# Patient Record
Sex: FEMALE | Race: WHITE | Employment: FULL TIME | URBAN - METROPOLITAN AREA
[De-identification: names, ages, dates, MRNs, and addresses within clinical notes are randomized per-mention and may not be internally consistent; named-entity substitution may affect disease eponyms.]

---

## 2017-10-21 ENCOUNTER — HOSPITAL ENCOUNTER (OUTPATIENT)
Dept: MAMMOGRAPHY | Age: 52
Discharge: HOME | End: 2017-10-21
Attending: OBSTETRICS & GYNECOLOGY
Payer: COMMERCIAL

## 2017-10-21 DIAGNOSIS — Z12.31 ENCOUNTER FOR SCREENING MAMMOGRAM FOR BREAST CANCER: ICD-10-CM

## 2017-10-21 PROCEDURE — 77067 SCR MAMMO BI INCL CAD: CPT

## 2018-02-06 PROBLEM — I49.3 PVC'S (PREMATURE VENTRICULAR CONTRACTIONS): Status: ACTIVE | Noted: 2018-02-06

## 2018-02-06 PROBLEM — E11.9 CONTROLLED TYPE 2 DIABETES MELLITUS WITHOUT COMPLICATION (HCC): Status: ACTIVE | Noted: 2018-02-06

## 2018-02-06 PROBLEM — I51.7 LVH (LEFT VENTRICULAR HYPERTROPHY): Status: ACTIVE | Noted: 2018-02-06

## 2019-11-16 ENCOUNTER — ANESTHESIA EVENT (OUTPATIENT)
Dept: SURGERY | Age: 54
End: 2019-11-16
Payer: COMMERCIAL

## 2019-11-18 ENCOUNTER — ANESTHESIA (OUTPATIENT)
Dept: SURGERY | Age: 54
End: 2019-11-18
Payer: COMMERCIAL

## 2019-11-18 ENCOUNTER — HOSPITAL ENCOUNTER (OUTPATIENT)
Age: 54
Setting detail: OUTPATIENT SURGERY
Discharge: HOME | End: 2019-11-18
Attending: ORTHOPAEDIC SURGERY | Admitting: ORTHOPAEDIC SURGERY
Payer: COMMERCIAL

## 2019-11-18 VITALS
HEART RATE: 47 BPM | TEMPERATURE: 98 F | DIASTOLIC BLOOD PRESSURE: 63 MMHG | RESPIRATION RATE: 16 BRPM | SYSTOLIC BLOOD PRESSURE: 120 MMHG | OXYGEN SATURATION: 97 %

## 2019-11-18 LAB — GLUCOSE BLD STRIP.AUTO-MCNC: 127 MG/DL (ref 65–100)

## 2019-11-18 PROCEDURE — 76942 ECHO GUIDE FOR BIOPSY: CPT | Performed by: ORTHOPAEDIC SURGERY

## 2019-11-18 PROCEDURE — 77030003602 HC NDL NRV BLK BBMI -B: Performed by: ANESTHESIOLOGY

## 2019-11-18 PROCEDURE — 77030018836 HC SOL IRR NACL ICUM -A: Performed by: ORTHOPAEDIC SURGERY

## 2019-11-18 PROCEDURE — 82962 GLUCOSE BLOOD TEST: CPT

## 2019-11-18 PROCEDURE — C1713 ANCHOR/SCREW BN/BN,TIS/BN: HCPCS | Performed by: ORTHOPAEDIC SURGERY

## 2019-11-18 PROCEDURE — 77030003666 HC NDL SPINAL BD -A: Performed by: ORTHOPAEDIC SURGERY

## 2019-11-18 PROCEDURE — 74011000250 HC RX REV CODE- 250: Performed by: NURSE ANESTHETIST, CERTIFIED REGISTERED

## 2019-11-18 PROCEDURE — 76060000033 HC ANESTHESIA 1 TO 1.5 HR: Performed by: ORTHOPAEDIC SURGERY

## 2019-11-18 PROCEDURE — 76210000020 HC REC RM PH II FIRST 0.5 HR: Performed by: ORTHOPAEDIC SURGERY

## 2019-11-18 PROCEDURE — 76010010054 HC POST OP PAIN BLOCK: Performed by: ORTHOPAEDIC SURGERY

## 2019-11-18 PROCEDURE — 76010010054 HC POST OP PAIN BLOCK

## 2019-11-18 PROCEDURE — 77030027385 HC BLD SHV ARTHSCP STRY -B: Performed by: ORTHOPAEDIC SURGERY

## 2019-11-18 PROCEDURE — 74011000250 HC RX REV CODE- 250: Performed by: ANESTHESIOLOGY

## 2019-11-18 PROCEDURE — 76210000006 HC OR PH I REC 0.5 TO 1 HR: Performed by: ORTHOPAEDIC SURGERY

## 2019-11-18 PROCEDURE — 74011250636 HC RX REV CODE- 250/636: Performed by: NURSE ANESTHETIST, CERTIFIED REGISTERED

## 2019-11-18 PROCEDURE — 77030002916 HC SUT ETHLN J&J -A: Performed by: ORTHOPAEDIC SURGERY

## 2019-11-18 PROCEDURE — 74011250636 HC RX REV CODE- 250/636: Performed by: ORTHOPAEDIC SURGERY

## 2019-11-18 PROCEDURE — 77030020797 HC BIT DRL DISP SN -C: Performed by: ORTHOPAEDIC SURGERY

## 2019-11-18 PROCEDURE — 77030002933 HC SUT MCRYL J&J -A: Performed by: ORTHOPAEDIC SURGERY

## 2019-11-18 PROCEDURE — 74011250636 HC RX REV CODE- 250/636: Performed by: ANESTHESIOLOGY

## 2019-11-18 PROCEDURE — 77030040116 HC KIT SURG BIT GD Q-FX DISP S&N -C: Performed by: ORTHOPAEDIC SURGERY

## 2019-11-18 PROCEDURE — 76010000161 HC OR TIME 1 TO 1.5 HR INTENSV-TIER 1: Performed by: ORTHOPAEDIC SURGERY

## 2019-11-18 PROCEDURE — 77030033005 HC TBNG ARTHSC PMP STRY -B: Performed by: ORTHOPAEDIC SURGERY

## 2019-11-18 PROCEDURE — 77030025281 HC SPLNT ORTHGLS 1 BSNM -B: Performed by: ORTHOPAEDIC SURGERY

## 2019-11-18 PROCEDURE — 77030000032 HC CUF TRNQT ZIMM -B: Performed by: ORTHOPAEDIC SURGERY

## 2019-11-18 PROCEDURE — 77030002982 HC SUT POLYSRB J&J -A: Performed by: ORTHOPAEDIC SURGERY

## 2019-11-18 DEVICE — 1.8MM Q-FIX ALL SUTURE ANCHOR
Type: IMPLANTABLE DEVICE | Site: ANKLE | Status: FUNCTIONAL
Brand: Q-FIX

## 2019-11-18 DEVICE — FOOTPRINT ULTRA PK SUTURE ANCHOR 4.5
Type: IMPLANTABLE DEVICE | Site: ANKLE | Status: FUNCTIONAL
Brand: FOOTPRINT

## 2019-11-18 DEVICE — HEALICOIL PK 4.5 MM SUTURE ANCHOR                                    WITH ONE ULTRATAPE SUTURE COBRAID BLUE
Type: IMPLANTABLE DEVICE | Site: ANKLE | Status: FUNCTIONAL
Brand: HEALICOIL

## 2019-11-18 RX ORDER — SODIUM CHLORIDE 0.9 % (FLUSH) 0.9 %
5-40 SYRINGE (ML) INJECTION AS NEEDED
Status: DISCONTINUED | OUTPATIENT
Start: 2019-11-18 | End: 2019-11-18 | Stop reason: HOSPADM

## 2019-11-18 RX ORDER — ONDANSETRON 2 MG/ML
INJECTION INTRAMUSCULAR; INTRAVENOUS AS NEEDED
Status: DISCONTINUED | OUTPATIENT
Start: 2019-11-18 | End: 2019-11-18 | Stop reason: HOSPADM

## 2019-11-18 RX ORDER — DEXAMETHASONE SODIUM PHOSPHATE 4 MG/ML
INJECTION, SOLUTION INTRA-ARTICULAR; INTRALESIONAL; INTRAMUSCULAR; INTRAVENOUS; SOFT TISSUE AS NEEDED
Status: DISCONTINUED | OUTPATIENT
Start: 2019-11-18 | End: 2019-11-18 | Stop reason: HOSPADM

## 2019-11-18 RX ORDER — HYDROMORPHONE HYDROCHLORIDE 2 MG/ML
0.5 INJECTION, SOLUTION INTRAMUSCULAR; INTRAVENOUS; SUBCUTANEOUS
Status: DISCONTINUED | OUTPATIENT
Start: 2019-11-18 | End: 2019-11-18 | Stop reason: HOSPADM

## 2019-11-18 RX ORDER — OXYCODONE HYDROCHLORIDE 5 MG/1
10 TABLET ORAL
Status: DISCONTINUED | OUTPATIENT
Start: 2019-11-18 | End: 2019-11-18 | Stop reason: HOSPADM

## 2019-11-18 RX ORDER — LIDOCAINE HYDROCHLORIDE 10 MG/ML
0.1 INJECTION INFILTRATION; PERINEURAL AS NEEDED
Status: DISCONTINUED | OUTPATIENT
Start: 2019-11-18 | End: 2019-11-18 | Stop reason: HOSPADM

## 2019-11-18 RX ORDER — PROPOFOL 10 MG/ML
INJECTION, EMULSION INTRAVENOUS AS NEEDED
Status: DISCONTINUED | OUTPATIENT
Start: 2019-11-18 | End: 2019-11-18 | Stop reason: HOSPADM

## 2019-11-18 RX ORDER — SODIUM CHLORIDE, SODIUM LACTATE, POTASSIUM CHLORIDE, CALCIUM CHLORIDE 600; 310; 30; 20 MG/100ML; MG/100ML; MG/100ML; MG/100ML
100 INJECTION, SOLUTION INTRAVENOUS CONTINUOUS
Status: DISCONTINUED | OUTPATIENT
Start: 2019-11-18 | End: 2019-11-18 | Stop reason: HOSPADM

## 2019-11-18 RX ORDER — MIDAZOLAM HYDROCHLORIDE 1 MG/ML
INJECTION, SOLUTION INTRAMUSCULAR; INTRAVENOUS AS NEEDED
Status: DISCONTINUED | OUTPATIENT
Start: 2019-11-18 | End: 2019-11-18 | Stop reason: HOSPADM

## 2019-11-18 RX ORDER — LIDOCAINE HYDROCHLORIDE 20 MG/ML
INJECTION, SOLUTION EPIDURAL; INFILTRATION; INTRACAUDAL; PERINEURAL AS NEEDED
Status: DISCONTINUED | OUTPATIENT
Start: 2019-11-18 | End: 2019-11-18 | Stop reason: HOSPADM

## 2019-11-18 RX ORDER — LABETALOL HYDROCHLORIDE 5 MG/ML
INJECTION, SOLUTION INTRAVENOUS AS NEEDED
Status: DISCONTINUED | OUTPATIENT
Start: 2019-11-18 | End: 2019-11-18 | Stop reason: HOSPADM

## 2019-11-18 RX ORDER — MIDAZOLAM HYDROCHLORIDE 1 MG/ML
2 INJECTION, SOLUTION INTRAMUSCULAR; INTRAVENOUS
Status: DISCONTINUED | OUTPATIENT
Start: 2019-11-18 | End: 2019-11-18 | Stop reason: HOSPADM

## 2019-11-18 RX ORDER — ALBUTEROL SULFATE 0.83 MG/ML
2.5 SOLUTION RESPIRATORY (INHALATION) AS NEEDED
Status: DISCONTINUED | OUTPATIENT
Start: 2019-11-18 | End: 2019-11-18 | Stop reason: HOSPADM

## 2019-11-18 RX ORDER — FENTANYL CITRATE 50 UG/ML
100 INJECTION, SOLUTION INTRAMUSCULAR; INTRAVENOUS ONCE
Status: DISCONTINUED | OUTPATIENT
Start: 2019-11-18 | End: 2019-11-18 | Stop reason: HOSPADM

## 2019-11-18 RX ORDER — ESMOLOL HYDROCHLORIDE 10 MG/ML
INJECTION INTRAVENOUS AS NEEDED
Status: DISCONTINUED | OUTPATIENT
Start: 2019-11-18 | End: 2019-11-18 | Stop reason: HOSPADM

## 2019-11-18 RX ORDER — ONDANSETRON 2 MG/ML
4 INJECTION INTRAMUSCULAR; INTRAVENOUS ONCE
Status: DISCONTINUED | OUTPATIENT
Start: 2019-11-18 | End: 2019-11-18 | Stop reason: HOSPADM

## 2019-11-18 RX ORDER — MIDAZOLAM HYDROCHLORIDE 1 MG/ML
2 INJECTION, SOLUTION INTRAMUSCULAR; INTRAVENOUS ONCE
Status: COMPLETED | OUTPATIENT
Start: 2019-11-18 | End: 2019-11-18

## 2019-11-18 RX ORDER — BUPIVACAINE HYDROCHLORIDE AND EPINEPHRINE 2.5; 5 MG/ML; UG/ML
INJECTION, SOLUTION EPIDURAL; INFILTRATION; INTRACAUDAL; PERINEURAL
Status: COMPLETED | OUTPATIENT
Start: 2019-11-18 | End: 2019-11-18

## 2019-11-18 RX ORDER — NALOXONE HYDROCHLORIDE 0.4 MG/ML
0.1 INJECTION, SOLUTION INTRAMUSCULAR; INTRAVENOUS; SUBCUTANEOUS AS NEEDED
Status: DISCONTINUED | OUTPATIENT
Start: 2019-11-18 | End: 2019-11-18 | Stop reason: HOSPADM

## 2019-11-18 RX ORDER — OXYCODONE HYDROCHLORIDE 5 MG/1
5 TABLET ORAL
Status: DISCONTINUED | OUTPATIENT
Start: 2019-11-18 | End: 2019-11-18 | Stop reason: HOSPADM

## 2019-11-18 RX ORDER — DIPHENHYDRAMINE HYDROCHLORIDE 50 MG/ML
12.5 INJECTION, SOLUTION INTRAMUSCULAR; INTRAVENOUS
Status: DISCONTINUED | OUTPATIENT
Start: 2019-11-18 | End: 2019-11-18 | Stop reason: HOSPADM

## 2019-11-18 RX ORDER — PROPOFOL 10 MG/ML
INJECTION, EMULSION INTRAVENOUS
Status: DISCONTINUED | OUTPATIENT
Start: 2019-11-18 | End: 2019-11-18 | Stop reason: HOSPADM

## 2019-11-18 RX ORDER — BUPIVACAINE HYDROCHLORIDE AND EPINEPHRINE 5; 5 MG/ML; UG/ML
INJECTION, SOLUTION EPIDURAL; INTRACAUDAL; PERINEURAL
Status: COMPLETED | OUTPATIENT
Start: 2019-11-18 | End: 2019-11-18

## 2019-11-18 RX ORDER — SODIUM CHLORIDE 0.9 % (FLUSH) 0.9 %
5-40 SYRINGE (ML) INJECTION EVERY 8 HOURS
Status: DISCONTINUED | OUTPATIENT
Start: 2019-11-18 | End: 2019-11-18 | Stop reason: HOSPADM

## 2019-11-18 RX ORDER — CEFAZOLIN SODIUM/WATER 2 G/20 ML
2 SYRINGE (ML) INTRAVENOUS ONCE
Status: COMPLETED | OUTPATIENT
Start: 2019-11-18 | End: 2019-11-18

## 2019-11-18 RX ADMIN — ONDANSETRON 4 MG: 2 INJECTION INTRAMUSCULAR; INTRAVENOUS at 08:48

## 2019-11-18 RX ADMIN — SODIUM CHLORIDE, SODIUM LACTATE, POTASSIUM CHLORIDE, AND CALCIUM CHLORIDE 100 ML/HR: 600; 310; 30; 20 INJECTION, SOLUTION INTRAVENOUS at 07:15

## 2019-11-18 RX ADMIN — ESMOLOL HYDROCHLORIDE 10 MG: 10 INJECTION, SOLUTION INTRAVENOUS at 09:07

## 2019-11-18 RX ADMIN — BUPIVACAINE HYDROCHLORIDE AND EPINEPHRINE BITARTRATE 15 ML: 2.5; .005 INJECTION, SOLUTION EPIDURAL; INFILTRATION; INTRACAUDAL; PERINEURAL at 07:21

## 2019-11-18 RX ADMIN — LIDOCAINE HYDROCHLORIDE 50 MG: 20 INJECTION, SOLUTION EPIDURAL; INFILTRATION; INTRACAUDAL; PERINEURAL at 08:40

## 2019-11-18 RX ADMIN — MIDAZOLAM HYDROCHLORIDE 0.5 MG: 2 INJECTION, SOLUTION INTRAMUSCULAR; INTRAVENOUS at 08:52

## 2019-11-18 RX ADMIN — MIDAZOLAM 2 MG: 1 INJECTION INTRAMUSCULAR; INTRAVENOUS at 07:17

## 2019-11-18 RX ADMIN — PROPOFOL 30 MG: 10 INJECTION, EMULSION INTRAVENOUS at 08:40

## 2019-11-18 RX ADMIN — DEXAMETHASONE SODIUM PHOSPHATE 4 MG: 4 INJECTION, SOLUTION INTRAMUSCULAR; INTRAVENOUS at 08:48

## 2019-11-18 RX ADMIN — MIDAZOLAM HYDROCHLORIDE 1 MG: 2 INJECTION, SOLUTION INTRAMUSCULAR; INTRAVENOUS at 08:48

## 2019-11-18 RX ADMIN — PROPOFOL 250 MCG/KG/MIN: 10 INJECTION, EMULSION INTRAVENOUS at 08:40

## 2019-11-18 RX ADMIN — Medication 2 G: at 08:40

## 2019-11-18 RX ADMIN — BUPIVACAINE HYDROCHLORIDE AND EPINEPHRINE BITARTRATE 15 ML: 5; .005 INJECTION, SOLUTION EPIDURAL; INTRACAUDAL; PERINEURAL at 07:21

## 2019-11-18 RX ADMIN — LABETALOL HYDROCHLORIDE 10 MG: 5 INJECTION INTRAVENOUS at 09:20

## 2019-11-18 RX ADMIN — MIDAZOLAM HYDROCHLORIDE 0.5 MG: 2 INJECTION, SOLUTION INTRAMUSCULAR; INTRAVENOUS at 08:58

## 2019-11-18 NOTE — ANESTHESIA POSTPROCEDURE EVALUATION
Procedure(s):  LEFT ANKLE LIGAMENT RECONSTRUCTION  LEFT ANKLE ARTHROSCOPY CHOICE ANES. total IV anesthesia    Anesthesia Post Evaluation      Multimodal analgesia: multimodal analgesia used between 6 hours prior to anesthesia start to PACU discharge  Patient location during evaluation: PACU  Patient participation: complete - patient participated  Level of consciousness: awake and awake and alert  Pain management: adequate  Airway patency: patent  Anesthetic complications: no  Cardiovascular status: acceptable  Respiratory status: acceptable  Hydration status: acceptable  Post anesthesia nausea and vomiting:  controlled      Vitals Value Taken Time   /70 11/18/2019 10:19 AM   Temp 36.1 °C (97 °F) 11/18/2019  9:46 AM   Pulse 50 11/18/2019 10:21 AM   Resp 16 11/18/2019  9:46 AM   SpO2 98 % 11/18/2019 10:21 AM   Vitals shown include unvalidated device data.

## 2019-11-18 NOTE — ANESTHESIA PROCEDURE NOTES
Peripheral Block    Start time: 11/18/2019 7:17 AM  End time: 11/18/2019 7:21 AM  Performed by: Danilo Baeza MD  Authorized by: Danilo Baeza MD       Pre-procedure:    Indications: at surgeon's request and post-op pain management    Preanesthetic Checklist: patient identified, risks and benefits discussed, site marked, timeout performed, anesthesia consent given and patient being monitored    Timeout Time: 07:07          Block Type:   Block Type:  Popliteal  Laterality:  Left  Monitoring:  Standard ASA monitoring, continuous pulse ox, frequent vital sign checks, heart rate, oxygen and responsive to questions  Injection Technique:  Single shot  Procedures: ultrasound guided    Prep: chlorhexidine    Needle Type:  Stimuplex  Needle Gauge:  21 G  Needle Localization:  Ultrasound guidance and anatomical landmarks    Assessment:  Number of attempts:  1  Injection Assessment:  Incremental injection every 5 mL, local visualized surrounding nerve on ultrasound, negative aspiration for blood, no paresthesia, no intravascular symptoms and ultrasound image on chart  Patient tolerance:  Patient tolerated the procedure well with no immediate complications

## 2019-11-18 NOTE — ANESTHESIA PREPROCEDURE EVALUATION
Relevant Problems   No relevant active problems       Anesthetic History               Review of Systems / Medical History  Patient summary reviewed, nursing notes reviewed and pertinent labs reviewed    Pulmonary                   Neuro/Psych              Cardiovascular    Hypertension: well controlled              Exercise tolerance: >4 METS     GI/Hepatic/Renal                Endo/Other    Diabetes: well controlled, type 2         Other Findings              Physical Exam    Airway  Mallampati: II  TM Distance: 4 - 6 cm  Neck ROM: normal range of motion   Mouth opening: Normal     Cardiovascular  Regular rate and rhythm,  S1 and S2 normal,  no murmur, click, rub, or gallop             Dental  No notable dental hx       Pulmonary  Breath sounds clear to auscultation               Abdominal         Other Findings            Anesthetic Plan    ASA: 2  Anesthesia type: total IV anesthesia      Post-op pain plan if not by surgeon: peripheral nerve block single    Induction: Intravenous  Anesthetic plan and risks discussed with: Patient

## 2019-11-18 NOTE — DISCHARGE INSTRUCTIONS
ACTIVITY  Elevate foot. No weight bearing on operative foot. Get up and out of bed frequently. Move legs as much as possible while in bed. DIET  Clear liquids until no nausea or vomiting; then light diet for the first day. Advance to regular diet on second day, unless your doctor orders otherwise. PAIN  Take pain medications as directed by your doctor. Call your doctor if pain is NOT relieved by medication. DRESSING CARE   Keep dry and in place until follow up appointment    CALL YOUR DOCTOR IF YOU HAVE  Excessive bleeding that does not stop after holding mild pressure over the area. Temperature of 101 degrees or above. Redness, excessive swelling or bruising, and/or green or yellow, smelly discharge from incision. Loss of sensation - cold, white or blue toes. AFTER ANESTHESIA  For the first 24 hours and while taking narcotics for pain: DO NOT Drive, Drink Alcoholic beverages, or make important Decisions. Be aware of dizziness following anesthesia and while taking pain medication. OTHER INSTRUCTIONS    Take one 325 mg Aspirin every day while in splint. Get up and out of bed frequently; while in bed move your legs as much as possible. Appointment Date-As scheduled    Doctor's Number- [phone]  Patient Education        Learning About How to Use Crutches  Your Care Instructions  Crutches can help you walk when you have an injured hip, leg, knee, ankle, or foot. Your doctor will tell you how much weight--if any--you can put on your leg. Be sure your crutches fit you. When you stand up in your normal posture, there should be space for two or three fingers between the top of the crutch and your armpit. When you let your hands hang down, the hand  should be at your wrists. When you put your hands on the hand , your elbows should be slightly bent. To stay safe when using crutches:  · Look straight ahead, not down at your feet.   · Clear away small rugs, cords, or anything else that could cause you to trip, slip, or fall. · Be very careful around pets and small children. They can get in your path when you least expect it. · Be sure the rubber tips on your crutches are clean and in good condition to help prevent slipping. · Avoid slick conditions, such as wet floors and snowy or icy driveways. In bad weather, be especially careful on curbs and steps. How to use crutches  Getting ready to walk    1. Bend your elbows slightly. Press the padded top parts of the crutches against your sides, under your armpits. 2. If you have been told not to put any weight on your injured leg, keep that leg bent and off the ground. How to walk with crutches when you can put weight on the injured leg    1. Put both crutches about 12 inches in front of you. The crutches and your feet should form a triangle. Hold the crutches close enough to your body so you can push straight down on them, but leave room between the crutches for your body to pass through. Don't lean forward to reach farther. 2. Put your weight on the handgrips, not on the pads under your arms. Constant pressure against your underarms can cause numbness. 3. Move your weak or injured leg forward so it's almost even with the crutches. 4. Bring your good leg up, so it's even with your weak or injured leg. 5. Move your crutches about 12 inches in front of you, and start the next step. Sitting down    1. To sit, back up to the chair. Use one hand to hold both crutches by the handgrips, beside your injured leg. With the other hand, hold onto the seat and slowly lower yourself onto the chair. 2. Lay the crutches on the ground near your chair. If you prop them up, they may fall over. Getting up from a chair    1. To get up from a chair,  the crutches and put them in one hand beside your injured leg. 2. Put your weight on the handgrips of the crutches and on your strong leg to stand up.     How to go up and down stairs using crutches    1. Stand near the edge of the stairs. 2. Go up or down the stairs. ? If you are going up, step up with your stronger leg. Then bring the crutches and your weak or injured leg to the upper step. ? If you are going down, put your crutches and your weak or injured leg on the lower step. Then bring your stronger leg down to the lower step. Remember \"up with the good, and down with the bad\" to help you lead with the correct leg. Crutches: How to go up and down stairs with handrails    1. Stand near the edge of the stairs. 2. Put both crutches under the arm opposite the handrail. 3. Use the hand opposite the handrail to hold both crutches by the handgrips. 4. Hold onto the handrail as you go up or down. 5. Go up or down the stairs. ? If you are going up, step up with your stronger leg. Then bring the crutches and your weak or injured leg to the upper step. ? If you are going down, put your crutches and your weak or injured leg on the lower step. Then bring your stronger leg down to the lower step. Remember \"up with the good, down with the bad\" to help you lead with the correct leg. Follow-up care is a key part of your treatment and safety. Be sure to make and go to all appointments, and call your doctor if you are having problems. It's also a good idea to know your test results and keep a list of the medicines you take. Where can you learn more? Go to http://loretta-ye.info/. Enter X791 in the search box to learn more about \"Learning About How to Use Crutches. \"  Current as of: June 26, 2019  Content Version: 12.2  © 5242-1225 [employer]. Care instructions adapted under license by [employer] (which disclaims liability or warranty for this information).  If you have questions about a medical condition or this instruction, always ask your healthcare professional. [address] any warranty or liability for your use of this information. DISCHARGE SUMMARY from Nurse    PATIENT INSTRUCTIONS:    After general anesthesia or intravenous sedation, for 24 hours or while taking prescription Narcotics:  · Limit your activities  · Do not drive and operate hazardous machinery  · Do not make important personal or business decisions  · Do  not drink alcoholic beverages  · If you have not urinated within 8 hours after discharge, please contact your surgeon on call. *  Please give a list of your current medications to your Primary Care Provider. *  Please update this list whenever your medications are discontinued, doses are      changed, or new medications (including over-the-counter products) are added. *  Please carry medication information at all times in case of emergency situations. These are general instructions for a healthy lifestyle:    No smoking/ No tobacco products/ Avoid exposure to second hand smoke    Surgeon General's Warning:  Quitting smoking now greatly reduces serious risk to your health. Obesity, smoking, and sedentary lifestyle greatly increases your risk for illness    A healthy diet, regular physical exercise & weight monitoring are important for maintaining a healthy lifestyle    You may be retaining fluid if you have a history of heart failure or if you experience any of the following symptoms:  Weight gain of 3 pounds or more overnight or 5 pounds in a week, increased swelling in our hands or feet or shortness of breath while lying flat in bed. Please call your doctor as soon as you notice any of these symptoms; do not wait until your next office visit. Recognize signs and symptoms of STROKE:    F-face looks uneven    A-arms unable to move or move unevenly    S-speech slurred or non-existent    T-time-call 911 as soon as signs and symptoms begin-DO NOT go       Back to bed or wait to see if you get better-TIME IS BRAIN.

## 2019-11-19 NOTE — OP NOTES
[facility]  OPERATIVE REPORT    Name:  Randa العراقي  MR#:  [id number]  :  1965  ACCOUNT #:  [de-identified]  DATE OF SERVICE:  2019    PREOPERATIVE DIAGNOSIS:  Left chronic lateral ankle instability. POSTOPERATIVE DIAGNOSIS:  Left chronic lateral ankle instability. PROCEDURES PERFORMED:  1. Left ankle arthroscopy with microfracture of lateral talar osteochondral defect, 5 x 5 cm, 64209.  2.  Left lateral ankle ligament reconstruction, 37152. SURGEON:  Melina Shabazz III, MD        ANESTHESIA:  Popliteal block with monitored anesthesia care. ESTIMATED BLOOD LOSS:  Minimal.    TOURNIQUET TIME:  38 minutes at 250 mmHg. ANTIBIOTIC PROPHYLAXIS:  Ancef given prior to procedure. INDICATION:  The patient is a 51-year-old white female with symptomatic left chronic lateral ankle instability. She has failed conservative therapy and desires surgical treatment. Risks and benefits of the procedure including, but not limited to anesthetic complications, myocardial infarction, stroke, and death; and surgical complications including damage to nerve and blood vessels, risk of infection, incomplete pain relief, risk of recurrence, and need for additional surgery were discussed with the patient. She understands the risks and wish to proceed with surgery at this time. DETAILS OF PROCEDURE:  The patient's operative site was marked with indelible ink in the preoperative holding area. A block was placed by the Department of Anesthesia. The patient was brought to the operating room and placed supine. After preoperative surgical time-out, the left lower extremity was identified as surgical site, prepped and draped in standard sterile fashion with ChloraPrep solution. Standard anteromedial and anterolateral arthroscopic portals were established using nick and spread technique. Arthroscopic exam of the ankle was then performed at that time.   There was some synovitis located in the lateral gutter which was debrided using the oscillating shaver. The patient did have a full-thickness lateral talar OCD, which was debrided out to a stable border using a curette. Microfracture technique using an awl was also used to create bleeding channels at the base of the lesion. The scope equipment were removed. Both portals were closed using nylon sutures. Lateral approach to distal fibula was then performed at that time. The ATFL and CFL were removed with cuts from the distal fibula and then imbricated using suture anchors followed by suture tape placed from the tip of the fibula to the talar body. The inferior extensor retinaculum was then oversewn using Vicryl suture. The skin was repaired using Monocryl and nylon sutures. A sterile dressing was then applied followed by well-padded posterior splint. Anesthesia was discontinued. The patient was transferred back to recovery bed and taken to the recovery room in satisfactory condition. She appeared to tolerate the procedure well. There were no apparent surgical or anesthetic complications. All needle, instrument, and sponge counts were correct.       MD FRANCESCA Sanchez/[email or web address]  D:  11/18/2019 17:00  T:  11/19/2019 1:13  JOB #:  [id number]

## 2020-08-31 ENCOUNTER — HOSPITAL ENCOUNTER (OUTPATIENT)
Dept: PHYSICAL THERAPY | Age: 55
Discharge: HOME | End: 2020-08-31
Payer: COMMERCIAL

## 2020-08-31 PROCEDURE — 97161 PT EVAL LOW COMPLEX 20 MIN: CPT

## 2020-08-31 PROCEDURE — 97110 THERAPEUTIC EXERCISES: CPT

## 2020-08-31 NOTE — THERAPY EVALUATION
Kenya Orozco  : 1965  Primary: [facility]*  Secondary:  [facility] at [address]  [phone]   [phone]        OUTPATIENT PHYSICAL THERAPY:Initial Assessment 2020   ICD-10: Treatment Diagnosis: Left shoulder pain (M25.512); Muscle weakness, generalized (M62.81)  Precautions/Allergies:   Adhesive tape; Dimetapp [brompheniramine-pseudoephedrin]; Other medication; Purple dye; Shellfish containing products; and Pseudoephedrine   TREATMENT PLAN:  Effective Dates: 2020 TO 10/30/2020 (60 days). Frequency/Duration: 2 times a week for 60 Day(s) MEDICAL/REFERRING DIAGNOSIS:  Pain in left shoulder [M25.512]   DATE OF ONSET: 8/10/20  REFERRING PHYSICIAN: Soha Edwards*  MD Orders: Veronica Mcgill and Treat  Return MD Appointment: TBD     INITIAL ASSESSMENT:  Ms. Aixa Porter presents to physical therapy today with complaints of L shoulder pain that began on 8/10/20. She exhibits a loss of P/AROM, decreased glenohumeral joint mobility, and slight decrease in posterior cuff strength. These restrictions make it hard for her to reach behind her back, perform sudden movements with her arm or drive for long periods of time without pain. She will benefit from skilled therapy to address theses limitations to allow the patient to return to pain free ADL, functional and work activities. PROBLEM LIST (Impacting functional limitations):  1. Decreased Strength  2. Decreased ADL/Functional Activities  3. Increased Pain  4. Decreased Activity Tolerance  5. Decreased Flexibility/Joint Mobility  6. Decreased Knowledge of Precautions  7. Decreased [place] with Home Exercise Program INTERVENTIONS PLANNED: (Treatment may consist of any combination of the following)  1. Home Exercise Program (HEP)  2. Manual Therapy  3. Neuromuscular Re-education/Strengthening  4. Range of Motion (ROM)  5. Therapeutic Activites  6.  Therapeutic Exercise/Strengthening     GOALS: (Goals have been discussed and agreed upon with patient.)  Discharge Goals: Time Frame: 8/31/20 - 10/30/20  1. Patient will be independent with home exercise program without assistance from therapist.   2. Patient will report 11/55 Q-DASH score to demonstrate improved functional capacity. 3. Patient will demonstrate pain free symmetrical shoulder AROM to enable her to reach behind her back to don/doff bra. 4. Patient will report no pain when driving with L hand on top of wheel to improve her ability to perform work duties. OUTCOME MEASURE:   Tool Used: Disabilities of the Arm, Shoulder and Hand (DASH) Questionnaire - Quick Version  Score:  Initial: 22/55 (8/31/20)  Most Recent: X/55 (Date: -- )   Interpretation of Score: The DASH is designed to measure the activities of daily living in person's with upper extremity dysfunction or pain. Each section is scored on a 1-5 scale, 5 representing the greatest disability. The scores of each section are added together for a total score of 55. MEDICAL NECESSITY:   · Patient is expected to demonstrate progress in strength, range of motion, coordination and functional technique to increase independence with reaching behind her back and away from her body, and driving. REASON FOR SERVICES/OTHER COMMENTS:  · Patient continues to require present interventions due to patient's inability to perfrom all ADL and functional activities without pain. Total Duration:  PT Patient Time In/Time Out  Time In: 1505  Time Out: 1600    Rehabilitation Potential For Stated Goals: Excellent  Regarding Jacinda MYRIAM Weber's therapy, I certify that the treatment plan above will be carried out by a therapist or under their direction. Thank you for this referral,  Niesha Hankins     Referring Physician Signature: Esme Medina* No Signature is Required for this note.      PAIN/SUBJECTIVE:   Initial: Pain Intensity 1: 10/10 Post Session: 8/10   HISTORY:   History of Injury/Illness (Reason for Referral):  Ellard Kawasaki presents to physical therapy today with complaints of L shoulder pain that began on 8/10/20. She was reaching up into a top cabinet and felt a sharp pain and tearing sensation in her lateral shoulder. She at first wondered if it was from Crestor she was taking as she was also having multiple joint pain. She stopped the Crestor and L shoulder pain continued. Chief complaint of lateral and posterior shoulder pain when abducting or extending her arm, reaching behind her back or making a sudden move with her L UE. She has to drive a lot for her work and reports L shoulder pain when trying to keep her L hand at the top of the steering wheel for long periods of time. No history of shoulder problems. No imaging or injections have been completed at this time. No reports of radicular symptoms, neck pain, crepitus or weakness. She takes Meloxicam prn. PMH significant for diabetes, high cholesterol and HTN. Past Medical History/Comorbidities:   Ms. Manjinder Laura  has a past medical history of Diabetes ([address] History of anxiety, Hypertension, Menopause, Murmur, and PMDD (premenstrual dysphoric disorder). She also has no past medical history of Coagulation defects, COPD, Other ill-defined conditions(799.89), Psychiatric disorder, or Unspecified adverse effect of anesthesia. Ms. Manjinder Laura  has a past surgical history that includes hx lap cholecystectomy; hx  section; hx tubal ligation; hx tonsillectomy; hx rhinoplasty; hx total abdominal hysterectomy; hx orthopaedic (Right); hx orthopaedic (Left, ); and hx wisdom teeth extraction. Social History/Living Environment:     No barriers to rehab at home.  She works as an  requiring a lot of driving which can aggravate her shoulder  Prior Level of Function/Work/Activity:  Fully functioning in L UE before injury  Dominant Side:         RIGHT   Ambulatory/Rehab Services H2 Model Falls Risk Assessment   Risk Factors:       No Risk Factors Identified Ability to Rise from Chair:       (0)  Ability to rise in a single movement   Falls Prevention Plan:       No modifications necessary   Total: (5 or greater = High Risk): 0   ©2010 [facility] [employer]. All Rights Reserved. [facility] Patent #[id number]. Federal Law prohibits the replication, distribution or use without written permission from [facility] [employer]   Current Medications:       Current Outpatient Medications:     citalopram (CELEXA) 20 mg tablet, TAKE 1 TABLET BY MOUTH EVERY MORNING, Disp: 90 Tab, Rfl: 1    estradioL (VIVELLE) 0.1 mg/24 hr, Apply 1 patch transdermal twice weekly, Disp: 24 Patch, Rfl: 3    fluticasone (FLONASE) 50 mcg/actuation nasal spray, 2 Sprays by Both Nostrils route daily. , Disp: 1 Bottle, Rfl: 12    metFORMIN (GLUMETZA) 1,000 mg TG24 24 hour tablet, Take  by mouth daily. , Disp: , Rfl:     lisinopril (PRINIVIL, ZESTRIL) 20 mg tablet, Take  by mouth daily. , Disp: , Rfl:     fluticasone (FLONASE) 50 mcg/actuation nasal spray, 2 Sprays by Both Nostrils route two (2) times a day., Disp: , Rfl:     Cetirizine (ZYRTEC) 10 mg cap, Take  by mouth., Disp: , Rfl:    Date Last Reviewed:  8/31/2020   Number of Personal Factors/Comorbidities that affect the Plan of Care: 0: LOW COMPLEXITY   EXAMINATION:   OBSERVATION/POSTURE: Rounded shoulders, forward head.  Normal scapular positioning     PALPATION: No TTP in L shoulder    ROM: measured in degrees      RIGHT LEFT   Flexion 150/170 (active/passive) 145/160 - pain   Abduction full Full but slow   External Rotation 90/95 (45/90 deg abd) 58/60 - pain   Internal Rotation 60 60     STRENGTH:      RIGHT LEFT   Flexion 5/5 4+/5   Abduction 5/5 4+/5   External Rotation 5/5 4+/5   Internal Rotation 5/5 5/5     SPECIAL TESTS: (+) Neer, (+) HK, (-) drop arm, (-) ER lag, (-) empty/full can    NEUROLOGICAL SCREEN: normal    FUNCTIONAL MOBILITY      RIGHT LEFT   [place]y ER T4 T4 - some pain   [place] IR T6 T11   Horizontal adduction Posterior shld Posterior shld     FLEXIBILITY: decreased L pectoral muscles    JOINT MOBILITY: decreased glenohumeral joint mobility in posterior/inferior direction    HK ([place]); ER (external rotation); IR (internal rotation); HA (horizontal adduction); HAB (horizontal abduction); GH (glenohumeral); AC (acromioclavicular); TTP (tenderness to palpation); UT (upper trapezius); * (painful); nt (not tested); WFL (within functional limits)     Body Structures Involved:  1. Nerves  2. Bones  3. Joints  4. Muscles  5. Ligaments Body Functions Affected:  1. Sensory/Pain  2. Neuromusculoskeletal  3. Movement Related Activities and Participation Affected:  1. General Tasks and Demands  2. Mobility  3.  Interpersonal Interactions and Relationships   Number of elements (examined above) that affect the Plan of Care: 4+: HIGH COMPLEXITY   CLINICAL PRESENTATION:   Presentation: Stable and uncomplicated: LOW COMPLEXITY   CLINICAL DECISION MAKING:   Use of outcome tool(s) and clinical judgement create a POC that gives a: Clear prediction of patient's progress: LOW COMPLEXITY

## 2020-08-31 NOTE — PROGRESS NOTES
Quang Blankenship  : 1965  Primary: Phylliss [place] Of [facility] Vallejo*  Secondary:  [facility]rapy Center at [facility] 37, [address] [facility]  [phone]   [phone]      OUTPATIENT PHYSICAL THERAPY: Daily Treatment Note 2020  Visit Count:  1    ICD-10: Treatment Diagnosis: Left shoulder pain (M25.512); Muscle weakness, generalized (M62.81)  Precautions/Allergies:   Adhesive tape; Dimetapp [brompheniramine-pseudoephedrin]; Other medication; Purple dye; Shellfish containing products; and Pseudoephedrine   TREATMENT PLAN:  Effective Dates: 2020 TO 10/30/2020 (60 days). Frequency/Duration: 2 times a week for 60 Day(s)    Pre-treatment Symptoms/Complaints:  L shoulder pain  Pain: Initial: Pain Intensity 1: 10/10 Post Session:  8/10   Medications Last Reviewed:  2020  Updated Objective Findings:  See evaluation note from today  TREATMENT:     Therapeutic Exercise: (15 Minutes):  Exercises per grid below to improve mobility, strength and coordination. Required minimal visual, verbal, manual and tactile cues to promote proper body alignment and promote proper body mechanics. Progressed resistance, range, repetitions and complexity of movement as indicated. Date:  2020   Activity/Exercise Parameters   Passive shoulder ER stretch - 45 and 90 deg abd 5 minutes   Passive shoulder flexion stretch 10x15 seconds   Pendulums - 5# x15-20                     Manual Therapy (     ): Manual techniques to facilitate improved motion and decreased pain. (Used abbreviations: MET - muscle energy technique; PNF - proprioceptive neuromuscular facilitation; NMR - neuromuscular re-education; a/p - anterior to posterior; p/a - posterior to anterior)   · None    Modalities: ice pack to L shoulder at end of session     Treatment/Session Summary:    · Response to Treatment:  Serena tolerated treatment well today. She exhibits a decrease in [facility] joint mobility limiting full AROM.  She will benefit from mobility exercises to improve overall function. · Communication/Consultation:  None today  · Equipment provided today:  HEP  · Recommendations/Intent for next treatment session: Next visit will focus on [facility] joint mobility, P/AROM, posterior cuff strengthening.     Total Treatment Billable Duration:  30 minute evaluation, 15 minutes therex  PT Patient Time In/Time Out  Time In: 5408  Time Out: 1600  [facility]    Future Appointments   Date Time Provider Leanna Torres   9/3/2020  1:00 PM [place] Stapler [facility]   9/9/2020  8:30 AM Ajay Stapler [facility]   9/14/2020 10:30 AM Ajay Stapler [facility]   9/17/2020 10:30 AM Ajay Stapler [facility]   9/21/2020 11:30 AM Ajay Stapler [facility]   9/24/2020 10:30 AM Ajay Stapler [facility]

## 2020-09-03 ENCOUNTER — HOSPITAL ENCOUNTER (OUTPATIENT)
Dept: PHYSICAL THERAPY | Age: 55
Discharge: HOME | End: 2020-09-03
Payer: COMMERCIAL

## 2020-09-03 PROCEDURE — 97110 THERAPEUTIC EXERCISES: CPT

## 2020-09-03 PROCEDURE — 97140 MANUAL THERAPY 1/> REGIONS: CPT

## 2020-09-03 NOTE — PROGRESS NOTES
Miguel Ángel Cellar  : 1965  Primary: Gautam List Of Sujata Vallejo*  Secondary:  [facility]rapy [facility] at [facility], [address] [facility]  [phone]   [phone]      OUTPATIENT PHYSICAL THERAPY: Daily Treatment Note 9/3/2020  Visit Count:  2    ICD-10: Treatment Diagnosis: Left shoulder pain (M25.512); Muscle weakness, generalized (M62.81)  Precautions/Allergies:   Adhesive tape; Dimetapp [brompheniramine-pseudoephedrin]; Other medication; Purple dye; Shellfish containing products; and Pseudoephedrine   TREATMENT PLAN:  Effective Dates: 2020 TO 10/30/2020 (60 days). Frequency/Duration: 2 times a week for 60 Day(s)    Pre-treatment Symptoms/Complaints:  Bailey Perez reports shoulder was sore after initial session but says that's to be expected. Working on [employer] as instructed  Pain: Initial: Pain Intensity 1: 610 Post Session:  8/10   Medications Last Reviewed:  9/3/2020  Updated Objective Findings:  Passive ER 75/90 deg  TREATMENT:     Therapeutic Exercise: (30 Minutes):  Exercises per grid below to improve mobility, strength and coordination. Required minimal visual, verbal, manual and tactile cues to promote proper body alignment and promote proper body mechanics. Progressed resistance, range, repetitions and complexity of movement as indicated. Date:  2020   Activity/Exercise Parameters   Passive shoulder ER stretch - 45 and 90 deg abd 5 minutes   Passive shoulder flexion stretch 10x15 seconds   Pendulums - 5# x15-20   Supine flexion with green band around wrists x15-20   Sidelying ER x30   Rows - red band 2x15   Shoulder extension stretch with green band x10     Manual Therapy (    Soft Tissue Mobilization Duration  Duration: 25 Minutes): Manual techniques to facilitate improved motion and decreased pain.  (Used abbreviations: MET - muscle energy technique; PNF - proprioceptive neuromuscular facilitation; NMR - neuromuscular re-education; a/p - anterior to posterior; p/a - posterior to anterior)   · Glenohumeral joint mobilizations in post/inf direction  · STM to L pecs and deltoid    Modalities: ice pack to L shoulder at end of session - 5 minutes    Treatment/Session Summary:    · Response to Treatment:  Radha Boyer did well with new exercises today. She exhibits good improvement in passive ER ROM after manual work and stretching. Posterior cuff strengthening exercises initiated with good tolerance but she was instructed to continue focusing on mobility at home. · Communication/Consultation:  None today  · Equipment provided today:  None today  · Recommendations/Intent for next treatment session: Next visit will focus on [address] [facility] joint mobility, P/AROM, posterior cuff strengthening.     Total Treatment Billable Duration:  30 minutes therex, 25 minutes manual  PT Patient Time In/Time Out  Time In: 1300  Time Out: 1405  Miguel     Future Appointments   Date Time Provider Leanna Torres   9/9/2020  9:30 AM Mahesh ARTEAGA [facility]   9/14/2020 10:30 AM Arlene Castellano [facility]   9/17/2020 10:30 AM Arlene Castellano [facility]   9/21/2020 11:30 AM Arlene Castellano [facility]   9/24/2020 10:30 AM Arlene Castellano [facility]

## 2020-09-09 ENCOUNTER — HOSPITAL ENCOUNTER (OUTPATIENT)
Dept: PHYSICAL THERAPY | Age: 55
Discharge: HOME | End: 2020-09-09
Payer: COMMERCIAL

## 2020-09-09 PROCEDURE — 97110 THERAPEUTIC EXERCISES: CPT

## 2020-09-09 PROCEDURE — 97140 MANUAL THERAPY 1/> REGIONS: CPT

## 2020-09-09 NOTE — PROGRESS NOTES
Quang Blankenship  : 1965  Primary: Kaylalilouie Delphos Of Sujata Vallejo*  Secondary:  [facility]rapy Center at [facility] 37, [address]  [phone]   [phone]      OUTPATIENT PHYSICAL THERAPY: Daily Treatment Note 2020  Visit Count:  3    ICD-10: Treatment Diagnosis: Left shoulder pain (M25.512); Muscle weakness, generalized (M62.81)  Precautions/Allergies:   Adhesive tape; Dimetapp [brompheniramine-pseudoephedrin]; Other medication; Purple dye; Shellfish containing products; and Pseudoephedrine   TREATMENT PLAN:  Effective Dates: 2020 TO 10/30/2020 (60 days). Frequency/Duration: 2 times a week for 60 Day(s)    Pre-treatment Symptoms/Complaints:  Mariam huddleston reports she didn't do much of her HEP over the weekend secondary to watching 3 turboBOTZds  Pain: Initial: Pain Intensity 1: 10 Post Session:  5/10   Medications Last Reviewed:  2020  Updated Objective Findings:  None Today  TREATMENT:     Therapeutic Exercise: (30 Minutes):  Exercises per grid below to improve mobility, strength and coordination. Required minimal visual, verbal, manual and tactile cues to promote proper body alignment and promote proper body mechanics. Progressed resistance, range, repetitions and complexity of movement as indicated. Date:  2020   Activity/Exercise Parameters   Passive shoulder ER stretch - 45 and 90 deg abd --   Passive shoulder flexion stretch --   Pendulums - 5# --   Supine flexion with green band around wrists --   Sidelying ER - 2# 2x15   Rows - red band 2x20   Shoulder extensions - orange band 2x15   Supine ER - 3# x30   Rhythmic stabilizations 4 minutes   Supine D2 - green band 2x20   Seated scapular retraction with ER - orange band 2x15   UBE 6 minutes     Manual Therapy (    Soft Tissue Mobilization Duration  Duration: 25 Minutes): Manual techniques to facilitate improved motion and decreased pain.  (Used abbreviations: MET - muscle energy technique; PNF - proprioceptive neuromuscular facilitation; NMR - neuromuscular re-education; a/p - anterior to posterior; p/a - posterior to anterior)   · Glenohumeral joint mobilizations in post/inf direction  · STM to L pecs and deltoid    Modalities: ice pack to L shoulder at end of session - 5 minutes    Treatment/Session Summary:    · Response to Treatment:  Serena tolerated treatment well today. Still feels pain with end range flexion movements. Fatigues quickly with posterior cuff strengthening but no increase in shoulder pain. · Communication/Consultation:  None today  · Equipment provided today:  None today  · Recommendations/Intent for next treatment session: Next visit will focus on [facility] joint mobility, P/AROM, posterior cuff strengthening.     Total Treatment Billable Duration:  30 minutes therex, 25 minutes manual  PT Patient Time In/Time Out  Time In: 0930  Time Out: 1035  Erum Gant    Future Appointments   Date Time Provider Leanna Torres   9/14/2020 10:30 AM Yue Dumont [facility]   9/17/2020 10:30 [facility]   9/21/2020 11:30 AM Yue Dumont [facility]   9/24/2020 10:30 AM Yue Dumont [facility]

## 2020-09-14 ENCOUNTER — APPOINTMENT (OUTPATIENT)
Dept: PHYSICAL THERAPY | Age: 55
End: 2020-09-14
Payer: COMMERCIAL

## 2020-09-17 ENCOUNTER — APPOINTMENT (OUTPATIENT)
Dept: PHYSICAL THERAPY | Age: 55
End: 2020-09-17
Payer: COMMERCIAL

## 2020-09-21 ENCOUNTER — HOSPITAL ENCOUNTER (OUTPATIENT)
Dept: PHYSICAL THERAPY | Age: 55
Discharge: HOME | End: 2020-09-21
Payer: COMMERCIAL

## 2020-09-21 NOTE — PROGRESS NOTES
Denise Gunderson  : 1965  Primary: Mann Calderón Of Sujata Vallejo*  Secondary:  Therapy Center at [address]  [phone]   [phone]          DATE: 2020    Patient canceled her appointment today due to illness. Will plan to follow up on next scheduled visit.       Gabe Hernandez, PT, DPT, OCS

## 2020-09-24 ENCOUNTER — APPOINTMENT (OUTPATIENT)
Dept: PHYSICAL THERAPY | Age: 55
End: 2020-09-24
Payer: COMMERCIAL

## 2020-11-02 PROBLEM — I77.71 DISSECTION OF RIGHT CAROTID ARTERY (HCC): Status: ACTIVE | Noted: 2020-11-02

## 2020-11-05 ENCOUNTER — HOSPITAL ENCOUNTER (OUTPATIENT)
Dept: CT IMAGING | Age: 55
Discharge: HOME | End: 2020-11-05
Attending: INTERNAL MEDICINE

## 2020-11-05 DIAGNOSIS — I10 HYPERTENSION, UNSPECIFIED TYPE: ICD-10-CM

## 2020-11-05 DIAGNOSIS — E11.9 CONTROLLED TYPE 2 DIABETES MELLITUS WITHOUT COMPLICATION, UNSPECIFIED WHETHER LONG TERM INSULIN USE (HCC): ICD-10-CM

## 2020-12-17 ENCOUNTER — APPOINTMENT (OUTPATIENT)
Dept: GENERAL RADIOLOGY | Age: 55
End: 2020-12-17
Attending: EMERGENCY MEDICINE
Payer: COMMERCIAL

## 2020-12-17 ENCOUNTER — HOSPITAL ENCOUNTER (EMERGENCY)
Age: 55
Discharge: HOME | End: 2020-12-17
Attending: STUDENT IN AN ORGANIZED HEALTH CARE EDUCATION/TRAINING PROGRAM
Payer: COMMERCIAL

## 2020-12-17 VITALS
RESPIRATION RATE: 16 BRPM | HEART RATE: 65 BPM | OXYGEN SATURATION: 99 % | SYSTOLIC BLOOD PRESSURE: 121 MMHG | TEMPERATURE: 98.1 F | BODY MASS INDEX: 32.44 KG/M2 | HEIGHT: 64 IN | WEIGHT: 190 LBS | DIASTOLIC BLOOD PRESSURE: 72 MMHG

## 2020-12-17 DIAGNOSIS — M54.6 ACUTE MIDLINE THORACIC BACK PAIN: Primary | ICD-10-CM

## 2020-12-17 LAB
ALBUMIN SERPL-MCNC: 3.9 G/DL (ref 3.5–5)
ALBUMIN/GLOB SERPL: 1.1 {RATIO} (ref 1.2–3.5)
ALP SERPL-CCNC: 64 U/L (ref 50–136)
ALT SERPL-CCNC: 39 U/L (ref 12–65)
ANION GAP SERPL CALC-SCNC: 6 MMOL/L (ref 7–16)
AST SERPL-CCNC: 21 U/L (ref 15–37)
ATRIAL RATE: 61 BPM
BASOPHILS # BLD: 0.1 K/UL (ref 0–0.2)
BASOPHILS NFR BLD: 1 % (ref 0–2)
BILIRUB SERPL-MCNC: 0.2 MG/DL (ref 0.2–1.1)
BUN SERPL-MCNC: 14 MG/DL (ref 6–23)
CALCIUM SERPL-MCNC: 9.4 MG/DL (ref 8.3–10.4)
CALCULATED P AXIS, ECG09: 70 DEGREES
CALCULATED R AXIS, ECG10: 16 DEGREES
CALCULATED T AXIS, ECG11: 63 DEGREES
CHLORIDE SERPL-SCNC: 105 MMOL/L (ref 98–107)
CO2 SERPL-SCNC: 28 MMOL/L (ref 21–32)
CREAT SERPL-MCNC: 0.92 MG/DL (ref 0.6–1)
DIAGNOSIS, 93000: NORMAL
DIFFERENTIAL METHOD BLD: NORMAL
EOSINOPHIL # BLD: 0.1 K/UL (ref 0–0.8)
EOSINOPHIL NFR BLD: 1 % (ref 0.5–7.8)
ERYTHROCYTE [DISTWIDTH] IN BLOOD BY AUTOMATED COUNT: 13.5 % (ref 11.9–14.6)
GLOBULIN SER CALC-MCNC: 3.6 G/DL (ref 2.3–3.5)
GLUCOSE SERPL-MCNC: 148 MG/DL (ref 65–100)
HCT VFR BLD AUTO: 42.3 % (ref 35.8–46.3)
HGB BLD-MCNC: 14 G/DL (ref 11.7–15.4)
IMM GRANULOCYTES # BLD AUTO: 0 K/UL (ref 0–0.5)
IMM GRANULOCYTES NFR BLD AUTO: 0 % (ref 0–5)
LYMPHOCYTES # BLD: 2.6 K/UL (ref 0.5–4.6)
LYMPHOCYTES NFR BLD: 27 % (ref 13–44)
MCH RBC QN AUTO: 30.2 PG (ref 26.1–32.9)
MCHC RBC AUTO-ENTMCNC: 33.1 G/DL (ref 31.4–35)
MCV RBC AUTO: 91.2 FL (ref 79.6–97.8)
MONOCYTES # BLD: 0.6 K/UL (ref 0.1–1.3)
MONOCYTES NFR BLD: 6 % (ref 4–12)
NEUTS SEG # BLD: 6.4 K/UL (ref 1.7–8.2)
NEUTS SEG NFR BLD: 65 % (ref 43–78)
NRBC # BLD: 0 K/UL (ref 0–0.2)
P-R INTERVAL, ECG05: 162 MS
PLATELET # BLD AUTO: 269 K/UL (ref 150–450)
PMV BLD AUTO: 9.8 FL (ref 9.4–12.3)
POTASSIUM SERPL-SCNC: 4.1 MMOL/L (ref 3.5–5.1)
PROT SERPL-MCNC: 7.5 G/DL (ref 6.3–8.2)
Q-T INTERVAL, ECG07: 412 MS
QRS DURATION, ECG06: 94 MS
QTC CALCULATION (BEZET), ECG08: 414 MS
RBC # BLD AUTO: 4.64 M/UL (ref 4.05–5.2)
SODIUM SERPL-SCNC: 139 MMOL/L (ref 136–145)
TROPONIN-HIGH SENSITIVITY: 9.4 PG/ML (ref 0–14)
VENTRICULAR RATE, ECG03: 61 BPM
WBC # BLD AUTO: 9.8 K/UL (ref 4.3–11.1)

## 2020-12-17 PROCEDURE — 93005 ELECTROCARDIOGRAM TRACING: CPT | Performed by: EMERGENCY MEDICINE

## 2020-12-17 PROCEDURE — 84484 ASSAY OF TROPONIN QUANT: CPT

## 2020-12-17 PROCEDURE — 85025 COMPLETE CBC W/AUTO DIFF WBC: CPT

## 2020-12-17 PROCEDURE — 71046 X-RAY EXAM CHEST 2 VIEWS: CPT

## 2020-12-17 PROCEDURE — 93005 ELECTROCARDIOGRAM TRACING: CPT | Performed by: STUDENT IN AN ORGANIZED HEALTH CARE EDUCATION/TRAINING PROGRAM

## 2020-12-17 PROCEDURE — 99283 EMERGENCY DEPT VISIT LOW MDM: CPT

## 2020-12-17 PROCEDURE — 80053 COMPREHEN METABOLIC PANEL: CPT

## 2020-12-17 NOTE — ED TRIAGE NOTES
Patient arrives ambulatory to triage with mask in place. Patient reports pain between shoulder blades. Recently had left jaw pain and \"heartburn. \"  Patient denies difficulty with ambulation. No injury to back. Took motrin prior to arrival and no relief from pain. Took 81mg aspirin today. No shortness of breath. No chest pain.

## 2020-12-18 NOTE — ED NOTES
I have reviewed discharge instructions with the patient and spouse. The patient and spouse verbalized understanding. Patient left ED via Discharge Method: ambulatory to Home with her . Opportunity for questions and clarification provided. Patient given 0 scripts. To continue your aftercare when you leave the hospital, you may receive an automated call from our care team to check in on how you are doing.  This is a free service and part of our promise to provide the best care and service to meet your aftercare needs. \" If you have questions, or wish to unsubscribe from this service please call [phone].  Thank you for Choosing our [employer] Emergency Department.

## 2020-12-18 NOTE — DISCHARGE INSTRUCTIONS
Continue alternating Tylenol and Motrin as needed for back pain. Follow-up with primary care physician in 2 to 3 days. Return to the ER for worsening worrisome symptoms.

## 2020-12-18 NOTE — ED PROVIDER NOTES
Patient is a 24-year-old female presents to [facility] to the emergency department complaining of sharp stabbing midline midthoracic back pain. States it began while she was shopping today and is still present. Rates it 4/10 severity. Denies any radiation of pain. No other associated symptoms, no dizziness, fever, chills, chest pain, shortness of breath, diaphoresis. Does not worsen with exertion. States she did have an episode of similar symptoms last week which resolved on its own. Patient took Motrin prior to arrival with no change in symptoms. Denies history of CAD. Last week she did endorse pain going to her left jaw but no other symptoms today. Patient does so for living, states she is slouched over her son machine often but denies history of back trauma or prior injuries.            Past Medical History:   Diagnosis Date    Bell's palsy     Diabetes ([address]     21 A1c 6.7 in care everywhere; does not check regularly; denies any ss of hypo    History of anxiety     Hypertension     managed with medication    Menopause     Murmur     PMDD (premenstrual dysphoric disorder)        Past Surgical History:   Procedure Laterality Date    HX  SECTION      x 3    HX LAP CHOLECYSTECTOMY      HX ORTHOPAEDIC Right     baker cyst right knee    HX ORTHOPAEDIC Left     Lt. knee scoped    HX RHINOPLASTY      HX TONSILLECTOMY      HX TOTAL ABDOMINAL HYSTERECTOMY      HX TUBAL LIGATION      HX WISDOM TEETH EXTRACTION           Family History:   Problem Relation Age of Onset    Heart Disease Father     Diabetes Father     Hypertension Father     Malignant Hyperthermia Neg Hx     Pseudocholinesterase Deficiency Neg Hx     Delayed Awakening Neg Hx     Post-op Nausea/Vomiting Neg Hx     Emergence Delirium Neg Hx     Post-op Cognitive Dysfunction Neg Hx     Other Neg Hx     Breast Cancer Neg Hx     Ovarian Cancer Neg Hx     Colon Cancer Neg Hx        Social History Socioeconomic History    Marital status:      Spouse name: Not on file    Number of children: Not on file    Years of education: Not on file    Highest education level: Not on file   Occupational History    Not on file   Social Needs    Financial resource strain: Not on file    Food insecurity     Worry: Not on file     Inability: Not on file    Transportation needs     Medical: Not on file     Non-medical: Not on file   Tobacco Use    Smoking status: Never Smoker    Smokeless tobacco: Never Used   Substance and Sexual Activity    Alcohol use: No    Drug use: No    Sexual activity: Yes     Partners: Male     Birth control/protection: Surgical     Comment: hyst   Lifestyle    Physical activity     Days per week: Not on file     Minutes per session: Not on file    Stress: Not on file   Relationships    Social connections     Talks on phone: Not on file     Gets together: Not on file     Attends Zoroastrianism service: Not on file     Active member of club or organization: Not on file     Attends meetings of clubs or organizations: Not on file     Relationship status: Not on file    Intimate partner violence     Fear of current or ex partner: Not on file     Emotionally abused: Not on file     Physically abused: Not on file     Forced sexual activity: Not on file   Other Topics Concern    Not on file   Social History Narrative    Not on file         ALLERGIES: Adhesive tape, Dimetapp [brompheniramine-pseudoephedrin], Other medication, Purple dye, Shellfish containing products, and Pseudoephedrine    Review of Systems   Constitutional: Negative for chills, fatigue and fever. HENT: Negative for facial swelling and sore throat. Eyes: Negative for visual disturbance. Respiratory: Negative for cough, chest tightness and shortness of breath. Cardiovascular: Negative for chest pain and palpitations. Gastrointestinal: Negative for abdominal pain, diarrhea, nausea and vomiting. Genitourinary: Negative for difficulty urinating, dysuria and flank pain. Musculoskeletal: Positive for back pain. Negative for myalgias, neck pain and neck stiffness. Skin: Negative for color change. Neurological: Negative for dizziness, speech difficulty, weakness, numbness and headaches. Psychiatric/Behavioral: Negative for confusion. Vitals:    12/17/20 1759   BP: 115/83   Pulse: 65   Resp: 16   Temp: 98.1 °F (36.7 °C)   SpO2: 98%   Weight: 86.2 kg (190 lb)   Height: 5' 4\" (1.626 m)            Physical Exam  Vitals signs and nursing note reviewed. Constitutional:       Appearance: Normal appearance. She is not ill-appearing or toxic-appearing. HENT:      Head: Normocephalic and atraumatic. Nose: Nose normal.      Mouth/Throat:      Mouth: Mucous membranes are moist.   Eyes:      Extraocular Movements: Extraocular movements intact. Neck:      Musculoskeletal: Normal range of motion. No neck rigidity. Cardiovascular:      Rate and Rhythm: Normal rate and regular rhythm. Pulses: Normal pulses. Heart sounds: Normal heart sounds. Pulmonary:      Effort: Pulmonary effort is normal. No respiratory distress. Breath sounds: Normal breath sounds. Abdominal:      General: Abdomen is flat. There is no distension. Palpations: Abdomen is soft. Tenderness: There is no abdominal tenderness. Musculoskeletal: Normal range of motion. Comments: Thoracic spine: No tenderness on exam, no step-offs deformities. No evidence of trauma. good strength in bilateral upper extremities. Skin:     General: Skin is warm and dry. Neurological:      General: No focal deficit present. Mental Status: She is alert and oriented to person, place, and time. Psychiatric:         Mood and Affect: Mood normal.          MDM  Number of Diagnoses or Management Options  Diagnosis management comments: Patient was seen wearing appropriate PPE. Patient planes of midline back pain. Patient concern for atypical anginal equivalent. EKG obtained shows sinus rhythm, rate 61, , QRS 94, QTc 414, normal axis, no significant ST elevation or depression. Normal white count, stable H&H, Normal electrolytes, normal kidney function, troponin 9.4. Chest x-ray shows no acute abnormality. Patient symptoms have been going on for greater than 2 hours, with a troponin of 9.4 with no other significant findings, I feel patient is safe to be discharged home. Advised to follow-up with primary care physician in 2 to 3 days. Return to the ER for worsening or worrisome symptoms. Patient and family voiced understanding agreement this plan.        Amount and/or Complexity of Data Reviewed  Clinical lab tests: ordered and reviewed  Tests in the radiology section of CPT®: ordered and reviewed  Independent visualization of images, tracings, or specimens: yes    Risk of Complications, Morbidity, and/or Mortality  Presenting problems: moderate  Diagnostic procedures: low  Management options: low    Patient Progress  Patient progress: stable         Procedures

## 2020-12-30 NOTE — THERAPY DISCHARGE
Osmin Milisa  : 1965  Primary: Arnold Plascencia Of Sujata Vallejo*  Secondary:  Therapy Center at [address]  [phone]   [phone]        OUTPATIENT PHYSICAL THERAPY:Discontinuation Summary 2020   ICD-10: Treatment Diagnosis: Left shoulder pain (M25.512); Muscle weakness, generalized (M62.81)  Precautions/Allergies:   Adhesive tape, Dimetapp [brompheniramine-pseudoephedrin], Other medication, Purple dye, Shellfish containing products, and Pseudoephedrine   TREATMENT PLAN:  Effective Dates: 2020 TO 10/30/2020 (60 days). Frequency/Duration: 2 times a week for 60 Day(s) MEDICAL/REFERRING DIAGNOSIS:  Pain in left shoulder [M25.512]   DATE OF ONSET: 8/10/20  REFERRING PHYSICIAN: Yeny Thakkar*  MD Orders: Valeria Calixto and Treat  Return MD Appointment: TBD     DISCONTINUATION SUMMARY (30): Damaso Mccoy has not returned to therapy since 20. She will be discharged at this time. GOALS: (Goals have been discussed and agreed upon with patient.)  Discharge Goals: Time Frame: 20 - 10/30/20  1. Patient will be independent with home exercise program without assistance from therapist. NOT REASSESSED  2. Patient will report  Q-DASH score to demonstrate improved functional capacity. NOT REASSESSED  3. Patient will demonstrate pain free symmetrical shoulder AROM to enable her to reach behind her back to don/doff bra. NOT REASSESSED  4. Patient will report no pain when driving with L hand on top of wheel to improve her ability to perform work duties. NOT REASSESSED      OUTCOME MEASURE:   Tool Used: Disabilities of the Arm, Shoulder and Hand (DASH) Questionnaire - Quick Version  Score:  Initial:  (20)  Most Recent:  (Date: -- )   Interpretation of Score: The DASH is designed to measure the activities of daily living in person's with upper extremity dysfunction or pain.   Each section is scored on a 1-5 scale, 5 representing the greatest disability. The scores of each section are added together for a total score of 55. Rehabilitation Potential For Stated Goals: Excellent  Regarding Oneal Weber's therapy, I certify that the treatment plan above will be carried out by a therapist or under their direction. Thank you for this referral,  Cass Carter     Referring Physician Signature: Josefa Jacome* No Signature is Required for this note. PAIN/SUBJECTIVE:   Initial: Pain Intensity 1: 5/10 Post Session:  8/10   HISTORY:   History of Injury/Illness (Reason for Referral):  Violette Montano presents to physical therapy today with complaints of L shoulder pain that began on 8/10/20. She was reaching up into a top cabinet and felt a sharp pain and tearing sensation in her lateral shoulder. She at first wondered if it was from Crestor she was taking as she was also having multiple joint pain. She stopped the Crestor and L shoulder pain continued. Chief complaint of lateral and posterior shoulder pain when abducting or extending her arm, reaching behind her back or making a sudden move with her L UE. She has to drive a lot for her work and reports L shoulder pain when trying to keep her L hand at the top of the steering wheel for long periods of time. No history of shoulder problems. No imaging or injections have been completed at this time. No reports of radicular symptoms, neck pain, crepitus or weakness. She takes Meloxicam prn. PMH significant for diabetes, high cholesterol and HTN. Past Medical History/Comorbidities:   Ms. Sharyon Saint  has a past medical history of Bell's palsy, Diabetes ([address] History of anxiety, Hypertension, Menopause, Murmur, and PMDD (premenstrual dysphoric disorder). She also has no past medical history of Coagulation defects, COPD, Other ill-defined conditions(799.89), Psychiatric disorder, or Unspecified adverse effect of anesthesia.   Ms. Sharyon Saint  has a past surgical history that includes hx lap cholecystectomy; hx  section; hx tubal ligation; hx tonsillectomy; hx rhinoplasty; hx total abdominal hysterectomy; hx orthopaedic (Right); hx orthopaedic (Left, ); and hx wisdom teeth extraction. Social History/Living Environment:     No barriers to rehab at home. She works as an  requiring a lot of driving which can aggravate her shoulder  Prior Level of Function/Work/Activity:  Fully functioning in L UE before injury  Dominant Side:         RIGHT   Ambulatory/Rehab Services H2 Model Falls Risk Assessment   Risk Factors:       No Risk Factors Identified Ability to Rise from Chair:       (0)  Ability to rise in a single movement   Falls Prevention Plan:       No modifications necessary   Total: (5 or greater = High Risk): 0    [facility] [address] [place] [address] Patent #[id number]. Federal Law prohibits the replication, distribution or use without written permission from [facility] of [employer]   Current Medications:       Current Outpatient Medications:     amLODIPine (NORVASC) 5 mg tablet, Take 1 Tab by mouth daily. , Disp: 30 Tab, Rfl: 11    aspirin 81 mg chewable tablet, Take 81 mg by mouth daily. , Disp: , Rfl:     pitavastatin calcium (LIVALO) 2 mg tablet, Take 2 mg by mouth daily. , Disp: , Rfl:     citalopram (CELEXA) 20 mg tablet, Take 1 Tab by mouth daily. , Disp: 90 Tab, Rfl: 1    fluticasone (FLONASE) 50 mcg/actuation nasal spray, 2 Sprays by Both Nostrils route daily. , Disp: 1 Bottle, Rfl: 12    metFORMIN (GLUCOPHAGE) 500 mg tablet, Take 1,500 mg by mouth daily. Takes 2 tablets at night & 1 in the morning, Disp: , Rfl:     lisinopril (PRINIVIL, ZESTRIL) 20 mg tablet, Take  by mouth daily. , Disp: , Rfl:     fluticasone (FLONASE) 50 mcg/actuation nasal spray, 2 Sprays by Both Nostrils route two (2) times a day.  As needed, Disp: , Rfl:     Cetirizine (ZYRTEC) 10 mg cap, Take  by mouth., Disp: , Rfl:    Date Last Reviewed: 12/30/2020   Number of Personal Factors/Comorbidities that affect the Plan of Care: 0: LOW COMPLEXITY   EXAMINATION:

## 2021-08-30 PROBLEM — R00.1 BRADYCARDIA: Status: ACTIVE | Noted: 2021-08-30

## 2021-08-30 PROBLEM — E11.9 CONTROLLED TYPE 2 DIABETES MELLITUS WITHOUT COMPLICATION (HCC): Status: RESOLVED | Noted: 2018-02-06 | Resolved: 2021-08-30

## 2022-03-18 PROBLEM — R00.1 BRADYCARDIA: Status: ACTIVE | Noted: 2021-08-30

## 2022-03-18 PROBLEM — I77.71 DISSECTION OF RIGHT CAROTID ARTERY (HCC): Status: ACTIVE | Noted: 2020-11-02

## 2022-03-19 PROBLEM — I51.7 LVH (LEFT VENTRICULAR HYPERTROPHY): Status: ACTIVE | Noted: 2018-02-06

## 2022-03-20 PROBLEM — I49.3 PVC'S (PREMATURE VENTRICULAR CONTRACTIONS): Status: ACTIVE | Noted: 2018-02-06

## 2022-09-28 ENCOUNTER — HOSPITAL ENCOUNTER (EMERGENCY)
Age: 57
Discharge: HOME | End: 2022-09-28
Attending: EMERGENCY MEDICINE | Admitting: EMERGENCY MEDICINE
Payer: COMMERCIAL

## 2022-09-28 ENCOUNTER — APPOINTMENT (OUTPATIENT)
Dept: GENERAL RADIOLOGY | Age: 57
End: 2022-09-28
Payer: COMMERCIAL

## 2022-09-28 VITALS
BODY MASS INDEX: 32.95 KG/M2 | SYSTOLIC BLOOD PRESSURE: 160 MMHG | OXYGEN SATURATION: 98 % | HEART RATE: 53 BPM | DIASTOLIC BLOOD PRESSURE: 91 MMHG | WEIGHT: 193 LBS | RESPIRATION RATE: 14 BRPM | HEIGHT: 64 IN | TEMPERATURE: 98 F

## 2022-09-28 DIAGNOSIS — R00.2 PALPITATIONS: ICD-10-CM

## 2022-09-28 DIAGNOSIS — R07.9 NONSPECIFIC CHEST PAIN: Primary | ICD-10-CM

## 2022-09-28 DIAGNOSIS — R07.9 ACUTE CHEST PAIN: ICD-10-CM

## 2022-09-28 LAB
ALBUMIN SERPL-MCNC: 4.6 G/DL (ref 3.5–5)
ALBUMIN/GLOB SERPL: 1.6 {RATIO}
ALP SERPL-CCNC: 81 U/L (ref 45–117)
ALT SERPL-CCNC: 18 U/L (ref 13–61)
ANION GAP SERPL CALC-SCNC: 11 MMOL/L (ref 7–16)
AST SERPL-CCNC: 21 U/L (ref 15–37)
BILIRUB SERPL-MCNC: 0.3 MG/DL (ref 0.2–1.1)
BUN SERPL-MCNC: 12 MG/DL (ref 7–18)
CALCIUM SERPL-MCNC: 10 MG/DL (ref 8.3–10.4)
CHLORIDE SERPL-SCNC: 105 MMOL/L (ref 98–107)
CO2 SERPL-SCNC: 26 MMOL/L (ref 21–32)
CREAT SERPL-MCNC: 0.68 MG/DL (ref 0.6–1)
CRP SERPL-MCNC: <0.3 MG/DL (ref 0–0.9)
D DIMER PPP FEU-MCNC: <0.27 UG/ML(FEU)
ERYTHROCYTE [DISTWIDTH] IN BLOOD BY AUTOMATED COUNT: 13.4 % (ref 11.9–14.6)
GLOBULIN SER CALC-MCNC: 2.8 G/DL (ref 2.3–3.5)
GLUCOSE SERPL-MCNC: 119 MG/DL (ref 65–100)
HCT VFR BLD AUTO: 41.5 % (ref 35.8–46.3)
HGB BLD-MCNC: 13.8 G/DL (ref 11.7–15.4)
MAGNESIUM SERPL-MCNC: 1.6 MG/DL (ref 1.2–2.6)
MCH RBC QN AUTO: 29.9 PG (ref 26.1–32.9)
MCHC RBC AUTO-ENTMCNC: 33.3 G/DL (ref 31.4–35)
MCV RBC AUTO: 89.8 FL (ref 79.6–97.8)
NRBC # BLD: 0 K/UL (ref 0–0.2)
PLATELET # BLD AUTO: 220 K/UL (ref 150–450)
PMV BLD AUTO: 9.5 FL (ref 9.4–12.3)
POTASSIUM SERPL-SCNC: 4.2 MMOL/L (ref 3.5–5.1)
PROT SERPL-MCNC: 7.4 G/DL (ref 6.4–8.2)
RBC # BLD AUTO: 4.62 M/UL (ref 4.05–5.2)
SODIUM SERPL-SCNC: 142 MMOL/L (ref 136–145)
TROPONIN T SERPL HS-MCNC: <6 NG/L (ref 0–14)
TROPONIN T SERPL HS-MCNC: <6 NG/L (ref 0–14)
TSH, 3RD GENERATION: 1.85 UIU/ML (ref 0.58–3.7)
WBC # BLD AUTO: 6.2 K/UL (ref 4.3–11.1)

## 2022-09-28 PROCEDURE — 71046 X-RAY EXAM CHEST 2 VIEWS: CPT

## 2022-09-28 PROCEDURE — 84484 ASSAY OF TROPONIN QUANT: CPT

## 2022-09-28 PROCEDURE — 80053 COMPREHEN METABOLIC PANEL: CPT

## 2022-09-28 PROCEDURE — 86140 C-REACTIVE PROTEIN: CPT

## 2022-09-28 PROCEDURE — 94761 N-INVAS EAR/PLS OXIMETRY MLT: CPT | Performed by: EMERGENCY MEDICINE

## 2022-09-28 PROCEDURE — 85379 FIBRIN DEGRADATION QUANT: CPT

## 2022-09-28 PROCEDURE — 85027 COMPLETE CBC AUTOMATED: CPT

## 2022-09-28 PROCEDURE — 84443 ASSAY THYROID STIM HORMONE: CPT

## 2022-09-28 PROCEDURE — 99285 EMERGENCY DEPT VISIT HI MDM: CPT | Performed by: EMERGENCY MEDICINE

## 2022-09-28 PROCEDURE — 83735 ASSAY OF MAGNESIUM: CPT

## 2022-09-28 ASSESSMENT — PAIN DESCRIPTION - LOCATION
LOCATION: CHEST
LOCATION: CHEST

## 2022-09-28 ASSESSMENT — ENCOUNTER SYMPTOMS
COUGH: 0
WHEEZING: 0
SHORTNESS OF BREATH: 0
STRIDOR: 0
CHEST TIGHTNESS: 1
CHOKING: 0

## 2022-09-28 ASSESSMENT — PAIN DESCRIPTION - PAIN TYPE
TYPE: ACUTE PAIN
TYPE: ACUTE PAIN

## 2022-09-28 ASSESSMENT — PAIN SCALES - GENERAL
PAINLEVEL_OUTOF10: 2
PAINLEVEL_OUTOF10: 0
PAINLEVEL_OUTOF10: 2

## 2022-09-28 ASSESSMENT — PAIN - FUNCTIONAL ASSESSMENT: PAIN_FUNCTIONAL_ASSESSMENT: 0-10

## 2022-09-28 ASSESSMENT — PAIN DESCRIPTION - DESCRIPTORS
DESCRIPTORS: HEAVINESS
DESCRIPTORS: HEAVINESS

## 2022-09-28 ASSESSMENT — PAIN DESCRIPTION - FREQUENCY: FREQUENCY: INTERMITTENT

## 2022-09-28 NOTE — ED PROVIDER NOTES
Emergency Department Provider Note                   PCP:                No primary care provider on file. Age: 56 y.o. Sex: female     No diagnosis found. DISPOSITION          MDM  Number of Diagnoses or Management Options  Diagnosis management comments: Standard chest pain work-up. Amount and/or Complexity of Data Reviewed  Clinical lab tests: ordered and reviewed  Tests in the radiology section of CPT®: ordered and reviewed  Review and summarize past medical records: yes (Review of cardiology office visits indicate diagnosis and treatment for PVCs after Holter monitor. Echocardiogram also performed with only mild mitral valve regurgitation.)  Independent visualization of images, tracings, or specimens: yes (EKG at 12:49 PM: Is a sinus rhythm, rate of 55 and otherwise normal EKG. )    Risk of Complications, Morbidity, and/or Mortality  Presenting problems: moderate  Diagnostic procedures: moderate  Management options: moderate    Patient Progress  Patient progress: stable             Orders Placed This Encounter   Procedures    CBC    Comprehensive Metabolic Panel    Troponin T    D-Dimer, Quantitative    C-Reactive Protein    Magnesium    TSH    Cardiac Monitor    Pulse Oximetry    EKG 12 Lead    Saline lock IV        Medications - No data to display    New Prescriptions    No medications on file        Charlie Alonzo is a 56 y.o. female who presents to the Emergency Department with chief complaint of    Chief Complaint   Patient presents with    Chest Pain    Dizziness    Shortness of Breath      Patient presents for evaluation with a complaint of chest pain and palpitations. She states onset of symptoms was at rest at about an hour to an hour and a half prior to arrival and the symptoms lasted for about an hour. She reports a history of palpitations and has been seen by cardiology with monitor and diagnosed with PVCs.   She also was experiencing some lightheadedness and some shortness of breath. The pain was described in the left chest and breast area as sharp and stabbing intermittently with constant dull pain. She denies prior occurrence of similar pain. She states that her apple watch indicated that she might be in atrial fibrillation while she was having the palpitations. At the time of presentation her symptoms had resolved. The history is provided by the patient. Review of Systems   Constitutional:  Negative for chills and fever. Respiratory:  Positive for chest tightness. Negative for cough, choking, shortness of breath, wheezing and stridor. Cardiovascular:  Positive for chest pain and palpitations. Negative for leg swelling. Neurological:  Positive for light-headedness. All other systems reviewed and are negative. Past Medical History:   Diagnosis Date    Bell's palsy     Diabetes ([facility] [address]     19 A1c 6.7 in care everywhere; does not check regularly; denies any ss of hypo    History of anxiety     Hypertension     managed with medication    Menopause     Murmur     PMDD (premenstrual dysphoric disorder)         Past Surgical History:   Procedure Laterality Date     SECTION      x 3    CHOLECYSTECTOMY, LAPAROSCOPIC      HYSTERECTOMY, TOTAL ABDOMINAL (CERVIX REMOVED)      ORTHOPEDIC SURGERY Left 10-    Lt. knee scoped    ORTHOPEDIC SURGERY Right     baker cyst right knee    RHINOPLASTY      TONSILLECTOMY      TUBAL LIGATION      WISDOM TOOTH EXTRACTION          Family History   Problem Relation Age of Onset    Post-op Cognitive Dysfunction Neg Hx     Heart Disease Father     Diabetes Father     Hypertension Father     Malig Hypertherm Neg Hx     Pseudochol.  Deficiency Neg Hx     Ovarian Cancer Neg Hx     Breast Cancer Neg Hx     Other Neg Hx     Colon Cancer Neg Hx     Emergence Delirium Neg Hx     Post-op Nausea/Vomiting Neg Hx     Delayed Awakening Neg Hx         Social History     Socioeconomic History    Marital status:      Spouse name: None    Number of children: None    Years of education: None    Highest education level: None   Tobacco Use    Smoking status: Never    Smokeless tobacco: Never   Substance and Sexual Activity    Alcohol use: No    Drug use: No         Adhesive tape and Pseudoephedrine     Previous Medications    AMLODIPINE (NORVASC) 5 MG TABLET    Take 5 mg by mouth daily    ASPIRIN 81 MG CHEWABLE TABLET    Take 81 mg by mouth daily    CETIRIZINE HCL 10 MG CAPS    Take by mouth    CITALOPRAM (CELEXA) 20 MG TABLET    TAKE 1 TABLET BY MOUTH DAILY    FLUTICASONE (FLONASE) 50 MCG/ACT NASAL SPRAY    2 sprays by Nasal route 2 times daily    LISINOPRIL (PRINIVIL;ZESTRIL) 20 MG TABLET    Take by mouth daily    MAGNESIUM OXIDE (MAG-OX) 400 MG TABLET    Take 400 mg by mouth daily    METFORMIN (GLUCOPHAGE) 500 MG TABLET    Take 1,000 mg by mouth daily    PITAVASTATIN (LIVALO) 2 MG TABS TABLET    Take 2 mg by mouth daily    POTASSIUM CHLORIDE (KLOR-CON M) 10 MEQ EXTENDED RELEASE TABLET    Take 10 mEq by mouth daily        Vitals signs and nursing note reviewed. Patient Vitals for the past 4 hrs:   Temp Pulse Resp BP SpO2   09/28/22 1251 98 °F (36.7 °C) -- -- -- --   09/28/22 1245 -- 55 20 (!) 168/81 99 %          Physical Exam  Vitals and nursing note reviewed. Constitutional:       General: She is not in acute distress. Appearance: Normal appearance. She is not ill-appearing, toxic-appearing or diaphoretic. HENT:      Head: Normocephalic and atraumatic. Eyes:      Extraocular Movements: Extraocular movements intact. Conjunctiva/sclera: Conjunctivae normal.      Pupils: Pupils are equal, round, and reactive to light. Cardiovascular:      Rate and Rhythm: Normal rate and regular rhythm. Pulses: Normal pulses. Heart sounds: Normal heart sounds. Pulmonary:      Effort: Pulmonary effort is normal.      Breath sounds: Normal breath sounds. Chest:      Chest wall: No tenderness.    Abdominal:      General: There is no distension. Palpations: Abdomen is soft. Tenderness: There is no abdominal tenderness. Musculoskeletal:         General: No tenderness. Normal range of motion. Right lower leg: No edema. Left lower leg: No edema. Skin:     General: Skin is warm and dry. Capillary Refill: Capillary refill takes less than 2 seconds. Neurological:      General: No focal deficit present. Mental Status: She is alert and oriented to person, place, and time. Mental status is at baseline. Psychiatric:         Mood and Affect: Mood normal.         Behavior: Behavior normal.         Thought Content: Thought content normal.        Procedures    No results found for any visits on 09/28/22. No orders to display                       Voice dictation software was used during the making of this note. This software is not perfect and grammatical and other typographical errors may be present. This note has not been completely proofread for errors.      Preethi Lott, DO  09/28/22 6422

## 2022-09-28 NOTE — ED TRIAGE NOTES
Ambulatory to ER. C/o SOB. States her applewatch  said she may have Afib. Patient states she had palpitations and felt like her heart was racing. Feels dizzy and has some intermittent nausea.

## 2022-09-28 NOTE — DISCHARGE INSTRUCTIONS
Continue current medications. Call cardiology office if you do not hear from them in 24 hours regarding appointment to recheck. Recheck sooner for worse pain or persistent palpitations.

## 2022-09-28 NOTE — ED PROVIDER NOTES
Results Include:    Recent Results (from the past 24 hour(s))   CBC    Collection Time: 09/28/22  1:07 PM   Result Value Ref Range    WBC 6.2 4.3 - 11.1 K/uL    RBC 4.62 4.05 - 5.20 M/uL    Hemoglobin 13.8 11.7 - 15.4 g/dL    Hematocrit 41.5 35.8 - 46.3 %    MCV 89.8 79.6 - 97.8 FL    MCH 29.9 26.1 - 32.9 PG    MCHC 33.3 31.4 - 35.0 g/dL    RDW 13.4 11.9 - 14.6 %    Platelets 127 291 - 831 K/uL    MPV 9.5 9.4 - 12.3 FL    nRBC 0.00 0.0 - 0.2 K/uL   Comprehensive Metabolic Panel    Collection Time: 09/28/22  1:07 PM   Result Value Ref Range    Sodium 142 136 - 145 mmol/L    Potassium 4.2 3.5 - 5.1 mmol/L    Chloride 105 98 - 107 mmol/L    CO2 26 21 - 32 mmol/L    Anion Gap 11 7.0 - 16.0 mmol/L    Glucose 119 (H) 65 - 100 mg/dL    BUN 12 7.0 - 18.0 MG/DL    Creatinine 0.68 0.6 - 1.0 MG/DL    GFR African American >116 >60 ml/min/1.73m2    GFR Non- >60 >60 ml/min/1.73m2    Calcium 10.0 8.3 - 10.4 MG/DL    Total Bilirubin 0.3 0.2 - 1.1 MG/DL    ALT 18 13.0 - 61.0 U/L    AST 21 15 - 37 U/L    Alk Phosphatase 81 45.0 - 117.0 U/L    Total Protein 7.4 6.4 - 8.2 g/dL    Albumin 4.6 3.5 - 5.0 g/dL    Globulin 2.8 2.3 - 3.5 g/dL    Albumin/Globulin Ratio 1.6     D-Dimer, Quantitative    Collection Time: 09/28/22  1:07 PM   Result Value Ref Range    D-Dimer, Quant <0.27 <0.56 ug/ml(FEU)   C-Reactive Protein    Collection Time: 09/28/22  1:07 PM   Result Value Ref Range    CRP <0.3 0.0 - 0.9 mg/dL   Magnesium    Collection Time: 09/28/22  1:07 PM   Result Value Ref Range    Magnesium 1.6 1.2 - 2.6 mg/dL   TSH    Collection Time: 09/28/22  1:07 PM   Result Value Ref Range    TSH, 3RD GENERATION 1.850 0.58 - 3.70 uIU/mL   Troponin T    Collection Time: 09/28/22  1:07 PM   Result Value Ref Range    Troponin T <6.0 0 - 14 ng/L   Troponin T    Collection Time: 09/28/22  3:04 PM   Result Value Ref Range    Troponin T <6.0 0 - 14 ng/L     XR CHEST (2 VW)    Result Date: 9/28/2022  Chest 2 view CLINICAL INDICATION: Substernal moderate chest tightness with shortness of breath, palpitations, lightheadedness, PVCs, hypertensive COMPARISON: 12/17/2020 radiograph chest, also 11/5/2020 CT cardiac TECHNIQUE: Upright PA  and lateral views of the chest FINDINGS: Lung volumes are well inflated. Mediastinal silhouette and hilar contours are stable within normal limits. The lungs are clear. No acute osseous abnormalities are seen. No acute disease. Patient was sinus rhythm on a monitor here. Rare premature beats. No atrial fibrillation. Will refer patient back to her cardiologist.  We will leave message with referral line.      Letitia Galloway MD  09/28/22 1975

## 2022-10-31 ENCOUNTER — OFFICE VISIT (OUTPATIENT)
Dept: CARDIOLOGY CLINIC | Age: 57
End: 2022-10-31
Payer: COMMERCIAL

## 2022-10-31 VITALS
BODY MASS INDEX: 33.63 KG/M2 | DIASTOLIC BLOOD PRESSURE: 88 MMHG | HEART RATE: 54 BPM | WEIGHT: 197 LBS | SYSTOLIC BLOOD PRESSURE: 138 MMHG | HEIGHT: 64 IN

## 2022-10-31 DIAGNOSIS — I10 HYPERTENSION, ESSENTIAL: ICD-10-CM

## 2022-10-31 DIAGNOSIS — I77.71 DISSECTION OF CAROTID ARTERY (HCC): ICD-10-CM

## 2022-10-31 DIAGNOSIS — I51.7 LVH (LEFT VENTRICULAR HYPERTROPHY): ICD-10-CM

## 2022-10-31 DIAGNOSIS — I49.3 VENTRICULAR PREMATURE DEPOLARIZATION: Primary | ICD-10-CM

## 2022-10-31 DIAGNOSIS — E11.9 TYPE 2 DIABETES MELLITUS WITHOUT COMPLICATION, WITHOUT LONG-TERM CURRENT USE OF INSULIN (HCC): ICD-10-CM

## 2022-10-31 PROCEDURE — 93000 ELECTROCARDIOGRAM COMPLETE: CPT | Performed by: INTERNAL MEDICINE

## 2022-10-31 PROCEDURE — 99214 OFFICE O/P EST MOD 30 MIN: CPT | Performed by: INTERNAL MEDICINE

## 2022-10-31 PROCEDURE — 3074F SYST BP LT 130 MM HG: CPT | Performed by: INTERNAL MEDICINE

## 2022-10-31 PROCEDURE — 3078F DIAST BP <80 MM HG: CPT | Performed by: INTERNAL MEDICINE

## 2022-10-31 RX ORDER — TRAZODONE HYDROCHLORIDE 50 MG/1
TABLET ORAL
COMMUNITY
Start: 2021-10-20

## 2022-10-31 RX ORDER — TIRZEPATIDE 7.5 MG/.5ML
7.5 INJECTION, SOLUTION SUBCUTANEOUS WEEKLY
COMMUNITY
Start: 2022-09-27

## 2022-10-31 ASSESSMENT — ENCOUNTER SYMPTOMS
ABDOMINAL PAIN: 0
NAIL CHANGES: 0
COUGH: 0
EYE PAIN: 0
STRIDOR: 0
APHONIA: 0

## 2022-10-31 NOTE — PROGRESS NOTES
[address], [address] [place] [address]  PHONE: [phone]    SUBJECTIVE:   Lamar Gordon is a 56 y.o. female 1965   seen for a follow up visit regarding the following:     Chief Complaint   Patient presents with    Irregular Heart Beat    Bradycardia    Annual Exam           History of present illness: 56 y.o. female presented for follow-up 10/31/22 history of premature atrial contractions premature ventricular contractions dissection of right right carotid artery bradycardia LVH. Patiently formally seen by Dr. Nighat Irene. Seen in [facility] emergency department 9/2022 for palpitations and chest discomfort. Note reviewed from Dr. Antonina Prado MD 9/28/2022    Cardiac history:  Holter monitor 8/26/2021 minimum heart rate 39 beats a minute average heart rate 57 bpm max heart rate 130 bpm  9/2020 echocardiogram ejection fraction 55 to 14% grade 1 diastolic function patent foramen ovale with right-to-left shunting. Mild aortic regurgitation  [id number] Sinus  Bradycardia  - frequent PAC s# PACs = 3Old anterior infarct. Low voltage with rightward P-axis and rotation -possible pulmonary disease. Assessment:   Palpitations  Holter monitoring data including rhythm strips independently reviewed today. No premature ventricular contractions were noted during the monitor. Additionally very low burden of supraventricular ectopic beats [8 beats]. Discussed wearable device  No history of atrial fibrillation      Chest discomfort  Potential options are stress echocardiogram, nuclear stress perfusion study, cardiac CTA if recur in the future. Strong family his of CAD.        Hyperlipidemia  pitavastatin Tabs - 2 MG     HTN    Key CAD CHF Meds            amLODIPine (NORVASC) 5 MG tablet (Taking)    Class: Historical Med    lisinopril (PRINIVIL;ZESTRIL) 20 MG tablet (Taking)    Class: Historical Med    pitavastatin (LIVALO) 2 MG TABS tablet (Taking)    Class: Historical Med             DM   Discussion regarding indications for SGL 2 therapies    Current Outpatient Medications   Medication Sig    Tirzepatide (MOUNJARO) 7.5 MG/0.5ML SOPN SC injection Inject 7.5 mg into the skin once a week    traZODone (DESYREL) 50 MG tablet TAKE 1 TO 2 TABLETS(50  MG) BY MOUTH AT NIGHT AS NEEDED FOR SLEEP    amLODIPine (NORVASC) 5 MG tablet Take 5 mg by mouth daily    aspirin 81 MG chewable tablet Take 81 mg by mouth daily    Cetirizine HCl 10 MG CAPS Take by mouth    citalopram (CELEXA) 20 MG tablet TAKE 1 TABLET BY MOUTH DAILY    fluticasone (FLONASE) 50 MCG/ACT nasal spray 2 sprays by Nasal route 2 times daily    lisinopril (PRINIVIL;ZESTRIL) 20 MG tablet Take by mouth daily    magnesium oxide (MAG-OX) 400 MG tablet Take 400 mg by mouth daily    metFORMIN (GLUCOPHAGE) 500 MG tablet Take 500 mg by mouth daily    pitavastatin (LIVALO) 2 MG TABS tablet Take 2 mg by mouth daily    potassium chloride (KLOR-CON M) 10 MEQ extended release tablet Take 10 mEq by mouth daily     No current facility-administered medications for this visit. Past Medical History, Past Surgical History, Family history, Social History, and Medications were all reviewed with the patient today and updated as necessary.        Allergies   Allergen Reactions    Adhesive Tape Other (See Comments)     Causes blisters- paper o'k    Pseudoephedrine Palpitations     tachycardia     Past Medical History:   Diagnosis Date    Bell's palsy     Diabetes ([facility] [address]     19 A1c 6.7 in care everywhere; does not check regularly; denies any ss of hypo    History of anxiety     Hypertension     managed with medication    Menopause     Murmur     PMDD (premenstrual dysphoric disorder)      Past Surgical History:   Procedure Laterality Date     SECTION      x 3    CHOLECYSTECTOMY, LAPAROSCOPIC      HYSTERECTOMY, TOTAL ABDOMINAL (CERVIX REMOVED)      ORTHOPEDIC SURGERY Left     Lt. knee scoped    ORTHOPEDIC SURGERY Right baker cyst right knee    RHINOPLASTY      TONSILLECTOMY      TUBAL LIGATION      WISDOM TOOTH EXTRACTION       Family History   Problem Relation Age of Onset    Post-op Cognitive Dysfunction Neg Hx     Heart Disease Father     Diabetes Father     Hypertension Father     Malig Hypertherm Neg Hx     Pseudochol. Deficiency Neg Hx     Ovarian Cancer Neg Hx     Breast Cancer Neg Hx     Other Neg Hx     Colon Cancer Neg Hx     Emergence Delirium Neg Hx     Post-op Nausea/Vomiting Neg Hx     Delayed Awakening Neg Hx       Social History     Tobacco Use    Smoking status: Never    Smokeless tobacco: Never   Substance Use Topics    Alcohol use: No       ROS:    Review of Systems   Constitutional: Negative for fever. HENT:  Negative for stridor. Eyes:  Negative for pain. Cardiovascular:  Negative for chest pain. Respiratory:  Negative for cough. Endocrine: Negative for cold intolerance. Skin:  Negative for nail changes. Musculoskeletal:  Negative for arthritis. Gastrointestinal:  Negative for abdominal pain. Genitourinary:  Negative for dysuria. Neurological:  Negative for aphonia. Psychiatric/Behavioral:  Negative for altered mental status. Allergic/Immunologic: Negative for hives. PHYSICAL EXAM:    /88   Pulse 54   Ht 5' 4\" (1.626 m)   Wt 197 lb (89.4 kg)   BMI 33.81 kg/m²        Wt Readings from Last 3 Encounters:   10/31/22 197 lb (89.4 kg)   09/28/22 193 lb (87.5 kg)   03/30/22 195 lb (88.5 kg)     BP Readings from Last 3 Encounters:   10/31/22 138/88   09/28/22 (!) 160/91   03/30/22 120/80         Physical Exam  Vitals reviewed. HENT:      Head: Normocephalic. Right Ear: External ear normal.      Left Ear: External ear normal.      Nose: Nose normal.   Eyes:      General: No scleral icterus. Pulmonary:      Effort: Pulmonary effort is normal.   Abdominal:      General: There is no distension. Musculoskeletal:      Cervical back: Neck supple.    Skin:     General:

## 2022-11-30 ENCOUNTER — PATIENT MESSAGE (OUTPATIENT)
Dept: CARDIOLOGY CLINIC | Age: 57
End: 2022-11-30

## 2022-11-30 ENCOUNTER — TELEPHONE (OUTPATIENT)
Dept: CARDIOLOGY CLINIC | Age: 57
End: 2022-11-30

## 2022-11-30 NOTE — TELEPHONE ENCOUNTER
----- Message from Bartolome Leslie [place] sent at 11/30/2022 12:35 PM EST -----  Regarding: FW: Ecg    ----- Message -----  From: Joshua Alvarez LPN  Sent: 63/89/7324  12:23 PM EST  To: Bartolome Leslie MA  Subject: FW: Ecg                                            ----- Message -----  From: Aruna Wong  Sent: 11/30/2022  12:21 PM EST  To: [facility]  Subject: Ecg                                              I sent another ecg earlier. This is one from just now. I had an appt with my GP this morning. He recognized pvc this morning. I had 3 vax: flu, tdap, and shingles. This heavy feeling didnt start until after them. My head also feels weird. Kind of like pressure.  My bp about an hour ago was 159/87

## 2022-11-30 NOTE — TELEPHONE ENCOUNTER
Called pt with Dr Eunice Bennett response. Pt v/u.   Will contact our office if continues having symptoms 165.1

## 2022-11-30 NOTE — TELEPHONE ENCOUNTER
Called s/w pt/  Pt reports was seen by PCP today for yrly visit and vaccines. BP-159/87  at which time PCP increased Lisinopril to 30mg QD, switched Metformin to Jardiance. BP checked before nurse called 146/89  HR-62. Pt's main concern is with EKG's that she took with apple watch (attached)   states EKG showed HR in 40's and felt a smothering feeling at that time but since is feeling fine. Would like Tad to review the EKG's taken by her apple watch and get his opinion please.

## 2022-11-30 NOTE — TELEPHONE ENCOUNTER
Apple watch data reviewed ECG shows sinus rhythm with PACs premature atrial contractions. No arrhythmia arrhythmia of major concern noted.

## 2022-11-30 NOTE — TELEPHONE ENCOUNTER
----- Message from Tiesha Taylor sent at 11/30/2022 12:35 PM EST -----  Regarding: FW: Ecg    ----- Message -----  From: Talita Correa LPN  Sent: 78/20/7410  12:23 PM EST  To: Osito Rios MA  Subject: FW: Ecg                                            ----- Message -----  From: Km Johnson  Sent: 11/30/2022  12:21 PM EST  To: [facility]  Subject: Ecg                                              I sent another ecg earlier. This is one from just now. I had an appt with my GP this morning. He recognized pvc this morning. I had 3 vax: flu, tdap, and shingles. This heavy feeling didnt start until after them. My head also feels weird. Kind of like pressure.  My bp about an hour ago was 159/87

## 2023-04-03 PROBLEM — E11.9 CONTROLLED TYPE 2 DIABETES MELLITUS WITHOUT COMPLICATION (HCC): Status: RESOLVED | Noted: 2018-02-06 | Resolved: 2021-08-30

## 2023-08-25 ENCOUNTER — HOSPITAL ENCOUNTER (EMERGENCY)
Age: 58
Discharge: HOME | End: 2023-08-25
Attending: GENERAL PRACTICE
Payer: COMMERCIAL

## 2023-08-25 ENCOUNTER — APPOINTMENT (OUTPATIENT)
Dept: GENERAL RADIOLOGY | Age: 58
End: 2023-08-25
Payer: COMMERCIAL

## 2023-08-25 VITALS
OXYGEN SATURATION: 97 % | DIASTOLIC BLOOD PRESSURE: 83 MMHG | WEIGHT: 180 LBS | TEMPERATURE: 98.1 F | SYSTOLIC BLOOD PRESSURE: 146 MMHG | HEIGHT: 64 IN | BODY MASS INDEX: 30.73 KG/M2 | RESPIRATION RATE: 21 BRPM | HEART RATE: 55 BPM

## 2023-08-25 DIAGNOSIS — R00.1 BRADYCARDIA: ICD-10-CM

## 2023-08-25 DIAGNOSIS — M79.601 RIGHT ARM PAIN: Primary | ICD-10-CM

## 2023-08-25 LAB
ALBUMIN SERPL-MCNC: 4.5 G/DL (ref 3.5–5)
ALBUMIN/GLOB SERPL: 1.7 (ref 0.4–1.6)
ALP SERPL-CCNC: 69 U/L (ref 45–117)
ALT SERPL-CCNC: 11 U/L (ref 13–61)
ANION GAP SERPL CALC-SCNC: 9 MMOL/L (ref 2–11)
AST SERPL-CCNC: 12 U/L (ref 15–37)
BASOPHILS # BLD: 0.1 K/UL (ref 0–0.2)
BASOPHILS NFR BLD: 1 % (ref 0–2)
BILIRUB SERPL-MCNC: 0.3 MG/DL (ref 0.2–1.1)
BUN SERPL-MCNC: 18 MG/DL (ref 6–23)
CALCIUM SERPL-MCNC: 9.6 MG/DL (ref 8.3–10.4)
CHLORIDE SERPL-SCNC: 106 MMOL/L (ref 98–107)
CO2 SERPL-SCNC: 26 MMOL/L (ref 21–32)
CREAT SERPL-MCNC: 0.7 MG/DL (ref 0.6–1)
DIFFERENTIAL METHOD BLD: NORMAL
EKG ATRIAL RATE: 93 BPM
EKG DIAGNOSIS: NORMAL
EKG P AXIS: 78 DEGREES
EKG P-R INTERVAL: 178 MS
EKG Q-T INTERVAL: 421 MS
EKG QRS DURATION: 113 MS
EKG QTC CALCULATION (BAZETT): 464 MS
EKG R AXIS: -16 DEGREES
EKG T AXIS: 61 DEGREES
EKG VENTRICULAR RATE: 73 BPM
EOSINOPHIL # BLD: 0.1 K/UL (ref 0–0.8)
EOSINOPHIL NFR BLD: 2 % (ref 0.5–7.8)
ERYTHROCYTE [DISTWIDTH] IN BLOOD BY AUTOMATED COUNT: 13.8 % (ref 11.9–14.6)
GLOBULIN SER CALC-MCNC: 2.6 G/DL (ref 2.8–4.5)
GLUCOSE SERPL-MCNC: 147 MG/DL (ref 65–100)
HCT VFR BLD AUTO: 42.6 % (ref 35.8–46.3)
HGB BLD-MCNC: 14 G/DL (ref 11.7–15.4)
IMM GRANULOCYTES # BLD AUTO: 0 K/UL (ref 0–0.5)
IMM GRANULOCYTES NFR BLD AUTO: 0 % (ref 0–5)
LYMPHOCYTES # BLD: 1.6 K/UL (ref 0.5–4.6)
LYMPHOCYTES NFR BLD: 27 % (ref 13–44)
MAGNESIUM SERPL-MCNC: 1.8 MG/DL (ref 1.2–2.6)
MCH RBC QN AUTO: 29.9 PG (ref 26.1–32.9)
MCHC RBC AUTO-ENTMCNC: 32.9 G/DL (ref 31.4–35)
MCV RBC AUTO: 90.8 FL (ref 82–102)
MONOCYTES # BLD: 0.4 K/UL (ref 0.1–1.3)
MONOCYTES NFR BLD: 7 % (ref 4–12)
NEUTS SEG # BLD: 3.7 K/UL (ref 1.7–8.2)
NEUTS SEG NFR BLD: 63 % (ref 43–78)
NRBC # BLD: 0 K/UL (ref 0–0.2)
PLATELET # BLD AUTO: 198 K/UL (ref 150–450)
PMV BLD AUTO: 10.1 FL (ref 9.4–12.3)
POTASSIUM SERPL-SCNC: 4.1 MMOL/L (ref 3.5–5.1)
PROT SERPL-MCNC: 7.1 G/DL (ref 6.4–8.2)
RBC # BLD AUTO: 4.69 M/UL (ref 4.05–5.2)
SODIUM SERPL-SCNC: 141 MMOL/L (ref 133–143)
TROPONIN T SERPL HS-MCNC: <6 NG/L (ref 0–14)
WBC # BLD AUTO: 5.9 K/UL (ref 4.3–11.1)

## 2023-08-25 PROCEDURE — 71045 X-RAY EXAM CHEST 1 VIEW: CPT

## 2023-08-25 PROCEDURE — 83735 ASSAY OF MAGNESIUM: CPT

## 2023-08-25 PROCEDURE — 93010 ELECTROCARDIOGRAM REPORT: CPT | Performed by: INTERNAL MEDICINE

## 2023-08-25 PROCEDURE — 85025 COMPLETE CBC W/AUTO DIFF WBC: CPT

## 2023-08-25 PROCEDURE — 99285 EMERGENCY DEPT VISIT HI MDM: CPT

## 2023-08-25 PROCEDURE — 80053 COMPREHEN METABOLIC PANEL: CPT

## 2023-08-25 PROCEDURE — 93005 ELECTROCARDIOGRAM TRACING: CPT | Performed by: GENERAL PRACTICE

## 2023-08-25 PROCEDURE — 84484 ASSAY OF TROPONIN QUANT: CPT

## 2023-08-25 ASSESSMENT — PAIN DESCRIPTION - LOCATION: LOCATION: ARM;BACK

## 2023-08-25 ASSESSMENT — PAIN - FUNCTIONAL ASSESSMENT: PAIN_FUNCTIONAL_ASSESSMENT: 0-10

## 2023-08-25 ASSESSMENT — PAIN DESCRIPTION - ORIENTATION: ORIENTATION: RIGHT

## 2023-08-25 NOTE — ED PROVIDER NOTES
Emergency Department Provider Note       PCP: ISABELLE Hampton NP   Age: 66 y.o. Sex: female     DISPOSITION Decision To Discharge 08/25/2023 05:47:12 PM       ICD-10-CM    1. Right arm pain  M79.601       2. Bradycardia  R00.1           Medical Decision Making     Complexity of Problems Addressed:  1 or more acute illnesses that pose a threat to life or bodily function. Data Reviewed and Analyzed:  I independently ordered and reviewed each unique test.  I reviewed external records: provider visit note from outside specialist.  Reviewed cardiology note from Dr. Iris San      I interpreted the X-rays mediastinum is normal, no pneumothorax, no infiltrate on the chest x-ray. I have reviewed the radiology report EKG shows no acute ST changes. For complete interpretation please review the procedure section. .    Discussion of management or test interpretation. Patient presents with right shoulder blade pain and right arm pain. Unclear the etiology. I have considered cardiopulmonary and thoracic emergencies such as ACS, PE, thoracic aortic dissection, pneumonia, among other emergent pathologies. Patient is relatively pain-free here. Therefore, I doubt aortic dissection. Based off atypical location I doubt ACS. Patient's troponin is normal nevertheless. Patient's EKG is unchanged from previous. She does have some episodes of bradycardia here but mostly the rate is in the 50s. I reviewed the cardiology note from last year which reported a loop recorder that said that her lowest heart rate was 37 and the average was 50s. Therefore, I do not feel this is new. She is not having any symptoms from bradycardia. Nevertheless, I have asked her to follow-up with her cardiologist.  I doubt any other emergent pathology. Patient and family are agreeable with plan and strict return precautions are given.       Risk of Complications and/or Morbidity of Patient Management:  Patient was discharged risks

## 2023-08-25 NOTE — ED TRIAGE NOTES
Pt ambulatory to triage with spouse. Pt reports right arm pain and pain between shoulder blades that started this morning when she woke up. Pt states she has a history of irregular heart rate and has a murmur. Pt states her [place] has said she is in a fib. Pt also reports intermittent nausea. Pt denies vomiting. Pt denies chest pain or shortness of breath.

## 2023-11-15 ENCOUNTER — OFFICE VISIT (OUTPATIENT)
Age: 58
End: 2023-11-15
Payer: COMMERCIAL

## 2023-11-15 VITALS
DIASTOLIC BLOOD PRESSURE: 82 MMHG | HEART RATE: 64 BPM | BODY MASS INDEX: 30.39 KG/M2 | WEIGHT: 178 LBS | SYSTOLIC BLOOD PRESSURE: 130 MMHG | HEIGHT: 64 IN

## 2023-11-15 DIAGNOSIS — I77.71 DISSECTION OF CAROTID ARTERY (HCC): ICD-10-CM

## 2023-11-15 DIAGNOSIS — I49.3 VENTRICULAR PREMATURE DEPOLARIZATION: ICD-10-CM

## 2023-11-15 DIAGNOSIS — R00.2 PALPITATIONS: ICD-10-CM

## 2023-11-15 DIAGNOSIS — I51.7 LVH (LEFT VENTRICULAR HYPERTROPHY): Primary | ICD-10-CM

## 2023-11-15 PROCEDURE — 99214 OFFICE O/P EST MOD 30 MIN: CPT | Performed by: INTERNAL MEDICINE

## 2023-11-15 RX ORDER — EMPAGLIFLOZIN 10 MG/1
TABLET, FILM COATED ORAL
COMMUNITY
Start: 2023-08-25

## 2023-11-15 ASSESSMENT — ENCOUNTER SYMPTOMS
ABDOMINAL PAIN: 0
COUGH: 0
APHONIA: 0
NAIL CHANGES: 0
EYE PAIN: 0
STRIDOR: 0

## 2023-12-03 ENCOUNTER — APPOINTMENT (OUTPATIENT)
Dept: GENERAL RADIOLOGY | Age: 58
End: 2023-12-03
Payer: COMMERCIAL

## 2023-12-03 ENCOUNTER — HOSPITAL ENCOUNTER (EMERGENCY)
Age: 58
Discharge: HOME | End: 2023-12-03
Attending: EMERGENCY MEDICINE
Payer: COMMERCIAL

## 2023-12-03 VITALS
RESPIRATION RATE: 16 BRPM | DIASTOLIC BLOOD PRESSURE: 84 MMHG | SYSTOLIC BLOOD PRESSURE: 139 MMHG | TEMPERATURE: 98.5 F | BODY MASS INDEX: 29.88 KG/M2 | WEIGHT: 175 LBS | OXYGEN SATURATION: 97 % | HEIGHT: 64 IN | HEART RATE: 58 BPM

## 2023-12-03 DIAGNOSIS — R07.89 ATYPICAL CHEST PAIN: Primary | ICD-10-CM

## 2023-12-03 DIAGNOSIS — R00.2 PALPITATIONS: ICD-10-CM

## 2023-12-03 LAB
ANION GAP SERPL CALC-SCNC: 13 MMOL/L (ref 2–11)
BASOPHILS # BLD: 0.1 K/UL (ref 0–0.2)
BASOPHILS NFR BLD: 1 % (ref 0–2)
BUN SERPL-MCNC: 13 MG/DL (ref 6–23)
CALCIUM SERPL-MCNC: 10 MG/DL (ref 8.3–10.4)
CHLORIDE SERPL-SCNC: 103 MMOL/L (ref 98–107)
CO2 SERPL-SCNC: 25 MMOL/L (ref 21–32)
CREAT SERPL-MCNC: 0.65 MG/DL (ref 0.6–1)
DIFFERENTIAL METHOD BLD: ABNORMAL
EKG ATRIAL RATE: 81 BPM
EKG DIAGNOSIS: NORMAL
EKG P AXIS: 66 DEGREES
EKG P-R INTERVAL: 185 MS
EKG Q-T INTERVAL: 434 MS
EKG QRS DURATION: 108 MS
EKG QTC CALCULATION (BAZETT): 392 MS
EKG R AXIS: -14 DEGREES
EKG T AXIS: 70 DEGREES
EKG VENTRICULAR RATE: 49 BPM
EOSINOPHIL # BLD: 0.1 K/UL (ref 0–0.8)
EOSINOPHIL NFR BLD: 2 % (ref 0.5–7.8)
ERYTHROCYTE [DISTWIDTH] IN BLOOD BY AUTOMATED COUNT: 13.2 % (ref 11.9–14.6)
GLUCOSE SERPL-MCNC: 100 MG/DL (ref 65–100)
HCT VFR BLD AUTO: 44.9 % (ref 35.8–46.3)
HGB BLD-MCNC: 15.2 G/DL (ref 11.7–15.4)
IMM GRANULOCYTES # BLD AUTO: 0 K/UL (ref 0–0.5)
IMM GRANULOCYTES NFR BLD AUTO: 0 % (ref 0–5)
LYMPHOCYTES # BLD: 1.8 K/UL (ref 0.5–4.6)
LYMPHOCYTES NFR BLD: 35 % (ref 13–44)
MAGNESIUM SERPL-MCNC: 1.8 MG/DL (ref 1.2–2.6)
MCH RBC QN AUTO: 30.1 PG (ref 26.1–32.9)
MCHC RBC AUTO-ENTMCNC: 33.9 G/DL (ref 31.4–35)
MCV RBC AUTO: 88.9 FL (ref 82–102)
MONOCYTES # BLD: 0.5 K/UL (ref 0.1–1.3)
MONOCYTES NFR BLD: 9 % (ref 4–12)
NEUTS SEG # BLD: 2.9 K/UL (ref 1.7–8.2)
NEUTS SEG NFR BLD: 54 % (ref 43–78)
NRBC # BLD: 0 K/UL (ref 0–0.2)
PLATELET # BLD AUTO: 211 K/UL (ref 150–450)
PMV BLD AUTO: 9.2 FL (ref 9.4–12.3)
POTASSIUM SERPL-SCNC: 4.1 MMOL/L (ref 3.5–5.1)
RBC # BLD AUTO: 5.05 M/UL (ref 4.05–5.2)
SODIUM SERPL-SCNC: 141 MMOL/L (ref 133–143)
TROPONIN T SERPL HS-MCNC: <6 NG/L (ref 0–14)
TROPONIN T SERPL HS-MCNC: <6 NG/L (ref 0–14)
WBC # BLD AUTO: 5.3 K/UL (ref 4.3–11.1)

## 2023-12-03 PROCEDURE — 85025 COMPLETE CBC W/AUTO DIFF WBC: CPT

## 2023-12-03 PROCEDURE — 93005 ELECTROCARDIOGRAM TRACING: CPT | Performed by: EMERGENCY MEDICINE

## 2023-12-03 PROCEDURE — 93010 ELECTROCARDIOGRAM REPORT: CPT | Performed by: INTERNAL MEDICINE

## 2023-12-03 PROCEDURE — 80048 BASIC METABOLIC PNL TOTAL CA: CPT

## 2023-12-03 PROCEDURE — 83735 ASSAY OF MAGNESIUM: CPT

## 2023-12-03 PROCEDURE — 71045 X-RAY EXAM CHEST 1 VIEW: CPT

## 2023-12-03 PROCEDURE — 99285 EMERGENCY DEPT VISIT HI MDM: CPT

## 2023-12-03 PROCEDURE — 84484 ASSAY OF TROPONIN QUANT: CPT

## 2023-12-03 ASSESSMENT — ENCOUNTER SYMPTOMS
SHORTNESS OF BREATH: 1
COLOR CHANGE: 0
HEARTBURN: 0
BACK PAIN: 0
VOICE CHANGE: 0
COUGH: 0
VOMITING: 0
EYE REDNESS: 0
ABDOMINAL PAIN: 0
CHEST TIGHTNESS: 1
PHOTOPHOBIA: 0

## 2023-12-03 ASSESSMENT — PAIN SCALES - GENERAL: PAINLEVEL_OUTOF10: 1

## 2023-12-03 ASSESSMENT — PAIN - FUNCTIONAL ASSESSMENT: PAIN_FUNCTIONAL_ASSESSMENT: 0-10

## 2023-12-03 NOTE — ED PROVIDER NOTES
Emergency Department Provider Note       PCP: Talon Persaud, APRN - NP   Age: 66 y.o. Sex: female     DISPOSITION Decision To Discharge 12/03/2023 01:50:29 PM       ICD-10-CM    1. Atypical chest pain  R07.89       2. Palpitations  R00.2           Medical Decision Making     Complexity of Problems Addressed:  1 or more chronic illnesses with a severe exacerbation or progression. Data Reviewed and Analyzed:  I independently ordered and reviewed each unique test.  I reviewed external records: ED visit note from an outside group. I reviewed external records: provider visit note from PCP. I reviewed external records: provider visit note from outside specialist.  I reviewed external records: previous EKG including cardiologist interpretation. I reviewed external records: previous lab results from outside ED. I reviewed external records: previous imaging study including radiologist interpretation. I independently interpreted the cardiac monitor rhythm strip sinus rhythm with frequent PACs. I interpreted the X-rays no pneumothorax or pneumonia. EKG interpretation, independent cardiologist: Normal sinus rhythm, rate of 49, frequent PACs, low voltage, no gross ST segment elevation or depression noted. Discussion of management or test interpretation. Appears to be in a sinus rhythm here currently. Plan for screening labs. Multiple PACs. Will continue to monitor symptoms. 1:51 PM EST  Cardiac enzymes are negative x 2. Vital signs remained stable here. Chest x-ray and are stable. Normal basic labs as well. Plan to discharge home. She has follow-up scheduled with her cardiologist.      Risk of Complications and/or Morbidity of Patient Management:  Shared medical decision making was utilized in creating the patients health plan today. History       Patient presents the ER with complaints of chest discomfort, shortness of breath and dizziness.   Patient states that she woke

## 2023-12-03 NOTE — DISCHARGE INSTRUCTIONS
As we discussed, your testing done today is reassuring.   I do not feel the cause of your discomfort in your chest is related to a heart attack  You do continue to have frequent PACs on the monitor  I encourage you to follow-up with your cardiologist  Follow-up with your primary care physician  Return to the ER for any new, worsening or life-threatening symptoms

## 2023-12-03 NOTE — ED TRIAGE NOTES
Pt reports that she has irregular heart beats and has worn a monitor and supposed to see cardiovascular tomorrow, reports cp, lightheaded, shob and high blood pressure.

## 2023-12-04 ENCOUNTER — OFFICE VISIT (OUTPATIENT)
Dept: VASCULAR SURGERY | Age: 58
End: 2023-12-04
Payer: COMMERCIAL

## 2023-12-04 VITALS
DIASTOLIC BLOOD PRESSURE: 86 MMHG | BODY MASS INDEX: 29.71 KG/M2 | WEIGHT: 174 LBS | OXYGEN SATURATION: 98 % | HEART RATE: 64 BPM | HEIGHT: 64 IN | SYSTOLIC BLOOD PRESSURE: 156 MMHG

## 2023-12-04 DIAGNOSIS — I65.21 CAROTID OCCLUSION, RIGHT: Primary | ICD-10-CM

## 2023-12-04 PROCEDURE — 99203 OFFICE O/P NEW LOW 30 MIN: CPT | Performed by: STUDENT IN AN ORGANIZED HEALTH CARE EDUCATION/TRAINING PROGRAM

## 2023-12-04 NOTE — PROGRESS NOTES
Metal implants or AICD: no    Dye allergy: no     Smoker:  Tobacco Use      Smoking status: Never      Smokeless tobacco: Never      Referred by: No ref. provider found    ISABELLE Venegas NP, MD    Elements of this note have been dictated using speech recognition software. As a result, errors of speech recognition may have occurred.

## 2023-12-14 ENCOUNTER — OFFICE VISIT (OUTPATIENT)
Age: 58
End: 2023-12-14
Payer: COMMERCIAL

## 2023-12-14 VITALS
WEIGHT: 175 LBS | HEIGHT: 64 IN | HEART RATE: 58 BPM | DIASTOLIC BLOOD PRESSURE: 78 MMHG | BODY MASS INDEX: 29.88 KG/M2 | SYSTOLIC BLOOD PRESSURE: 126 MMHG

## 2023-12-14 DIAGNOSIS — I20.9 ANGINA, CLASS III (HCC): ICD-10-CM

## 2023-12-14 DIAGNOSIS — I49.1 PREMATURE ATRIAL CONTRACTIONS: Primary | ICD-10-CM

## 2023-12-14 DIAGNOSIS — E78.5 HYPERLIPIDEMIA LDL GOAL <70: ICD-10-CM

## 2023-12-14 DIAGNOSIS — E11.9 TYPE 2 DIABETES MELLITUS WITHOUT COMPLICATION, WITHOUT LONG-TERM CURRENT USE OF INSULIN (HCC): ICD-10-CM

## 2023-12-14 DIAGNOSIS — I20.0 ACCELERATING ANGINA (HCC): ICD-10-CM

## 2023-12-14 LAB
ANION GAP SERPL CALC-SCNC: 5 MMOL/L (ref 2–11)
BASOPHILS # BLD: 0.1 K/UL (ref 0–0.2)
BASOPHILS NFR BLD: 1 % (ref 0–2)
BUN SERPL-MCNC: 17 MG/DL (ref 6–23)
CALCIUM SERPL-MCNC: 9.8 MG/DL (ref 8.3–10.4)
CHLORIDE SERPL-SCNC: 110 MMOL/L (ref 103–113)
CO2 SERPL-SCNC: 27 MMOL/L (ref 21–32)
CREAT SERPL-MCNC: 0.8 MG/DL (ref 0.6–1)
DIFFERENTIAL METHOD BLD: ABNORMAL
EOSINOPHIL # BLD: 0.1 K/UL (ref 0–0.8)
EOSINOPHIL NFR BLD: 2 % (ref 0.5–7.8)
ERYTHROCYTE [DISTWIDTH] IN BLOOD BY AUTOMATED COUNT: 13.4 % (ref 11.9–14.6)
GLUCOSE SERPL-MCNC: 100 MG/DL (ref 65–100)
HCT VFR BLD AUTO: 48.8 % (ref 35.8–46.3)
HGB BLD-MCNC: 15.8 G/DL (ref 11.7–15.4)
IMM GRANULOCYTES # BLD AUTO: 0 K/UL (ref 0–0.5)
IMM GRANULOCYTES NFR BLD AUTO: 0 % (ref 0–5)
LYMPHOCYTES # BLD: 1.5 K/UL (ref 0.5–4.6)
LYMPHOCYTES NFR BLD: 28 % (ref 13–44)
MCH RBC QN AUTO: 29.8 PG (ref 26.1–32.9)
MCHC RBC AUTO-ENTMCNC: 32.4 G/DL (ref 31.4–35)
MCV RBC AUTO: 91.9 FL (ref 82–102)
MONOCYTES # BLD: 0.4 K/UL (ref 0.1–1.3)
MONOCYTES NFR BLD: 7 % (ref 4–12)
NEUTS SEG # BLD: 3.4 K/UL (ref 1.7–8.2)
NEUTS SEG NFR BLD: 62 % (ref 43–78)
NRBC # BLD: 0 K/UL (ref 0–0.2)
PLATELET # BLD AUTO: 240 K/UL (ref 150–450)
PMV BLD AUTO: 9.9 FL (ref 9.4–12.3)
POTASSIUM SERPL-SCNC: 4.3 MMOL/L (ref 3.5–5.1)
RBC # BLD AUTO: 5.31 M/UL (ref 4.05–5.2)
SODIUM SERPL-SCNC: 142 MMOL/L (ref 136–146)
WBC # BLD AUTO: 5.5 K/UL (ref 4.3–11.1)

## 2023-12-14 PROCEDURE — 99214 OFFICE O/P EST MOD 30 MIN: CPT | Performed by: INTERNAL MEDICINE

## 2023-12-14 RX ORDER — NITROGLYCERIN 0.4 MG/1
0.4 TABLET SUBLINGUAL EVERY 5 MIN PRN
Qty: 25 TABLET | Refills: 3 | Status: SHIPPED | OUTPATIENT
Start: 2023-12-14

## 2023-12-14 RX ORDER — METOPROLOL SUCCINATE 25 MG/1
25 TABLET, EXTENDED RELEASE ORAL DAILY
Qty: 30 TABLET | Refills: 0 | Status: SHIPPED | OUTPATIENT
Start: 2023-12-14

## 2023-12-14 NOTE — PROGRESS NOTES
[address] [place]  [address] [facility], [address]  PHONE: [phone]    SUBJECTIVE:   Lorin Freed is a 66 y.o. female 1965   seen for a follow up visit regarding the following:     Chief Complaint   Patient presents with    1 Month Follow-Up    Results     Monitor    Bradycardia           History of present illness: 66 y.o. female presented for follow-up 12/14/23 lightheadedness palpitations previously seen by Dr. Kevin Maher. Cardiac monitor showed short events of fast heart rates but no major arrhythmia identified. Additional history of carotid occlusion. Patient with recurrent events of substernal chest discomfort now occurring at rest lasting for several minutes. She has a strong family history of premature obstructive coronary disease with multiple first-degree family members with history of MI. Interval history  history of premature atrial contractions premature ventricular contractions dissection of right right carotid artery bradycardia LVH. Patiently formally seen by Dr. Kevin Maher. Seen in [facility] emergency department 9/2022 for palpitations and chest discomfort. Note reviewed from Dr. Olga Gutierrez MD 9/28/2022    Cardiac history:  Holter monitor 8/26/2021 minimum heart rate 39 beats a minute average heart rate 57 bpm max heart rate 130 bpm  9/2020 echocardiogram ejection fraction 55 to 79% grade 1 diastolic function patent foramen ovale with right-to-left shunting. Mild aortic regurgitation  [id number] Sinus  Bradycardia  - frequent PAC s# PACs = 3Old anterior infarct. Low voltage with rightward P-axis and rotation -possible pulmonary disease. 11/15/2023 through 12/1/2023 extended Holter monitor no atrial fibrillation detected       Assessment:   Angina   Risks benefits and alternative therapies of cardiac catheterization were discussed. Risks include bleeding, myocardial infarction, stroke.   Following discussion of these risks patient agrees to

## 2023-12-15 RX ORDER — METOPROLOL SUCCINATE 25 MG/1
25 TABLET, EXTENDED RELEASE ORAL DAILY
Qty: 90 TABLET | OUTPATIENT
Start: 2023-12-15

## 2023-12-17 LAB — LPA SERPL-SCNC: 23.3 NMOL/L

## 2024-01-05 ENCOUNTER — OFFICE VISIT (OUTPATIENT)
Age: 59
End: 2024-01-05
Payer: COMMERCIAL

## 2024-01-05 VITALS
BODY MASS INDEX: 29.71 KG/M2 | HEART RATE: 52 BPM | WEIGHT: 174 LBS | SYSTOLIC BLOOD PRESSURE: 128 MMHG | HEIGHT: 64 IN | DIASTOLIC BLOOD PRESSURE: 80 MMHG

## 2024-01-05 DIAGNOSIS — R00.2 PALPITATIONS: ICD-10-CM

## 2024-01-05 DIAGNOSIS — E78.5 HYPERLIPIDEMIA LDL GOAL <70: ICD-10-CM

## 2024-01-05 DIAGNOSIS — Z78.9 STATIN INTOLERANCE: ICD-10-CM

## 2024-01-05 DIAGNOSIS — E11.9 TYPE 2 DIABETES MELLITUS WITHOUT COMPLICATION, WITHOUT LONG-TERM CURRENT USE OF INSULIN (HCC): Primary | ICD-10-CM

## 2024-01-05 PROCEDURE — 99214 OFFICE O/P EST MOD 30 MIN: CPT | Performed by: INTERNAL MEDICINE

## 2024-01-05 RX ORDER — METOPROLOL SUCCINATE 25 MG/1
25 TABLET, EXTENDED RELEASE ORAL DAILY
Qty: 90 TABLET | Refills: 3 | Status: SHIPPED | OUTPATIENT
Start: 2024-01-05

## 2024-01-05 RX ORDER — NITROGLYCERIN 0.4 MG/1
0.4 TABLET SUBLINGUAL EVERY 5 MIN PRN
Qty: 25 TABLET | Refills: 3 | Status: SHIPPED | OUTPATIENT
Start: 2024-01-05

## 2024-01-05 ASSESSMENT — ENCOUNTER SYMPTOMS
APHONIA: 0
ABDOMINAL PAIN: 0
STRIDOR: 0
EYE PAIN: 0
COUGH: 0
NAIL CHANGES: 0

## 2024-01-05 NOTE — PROGRESS NOTES
[address], [address]  [place] [address]  PHONE: [phone]    SUBJECTIVE:   Jess Wahl is a 58 y.o. female 1965   seen for a follow up visit regarding the following:     Chief Complaint   Patient presents with    Premature atrial contractions    Follow-up     Post cath           History of present illness: 58 y.o. female presented for follow-up 1/5/24 seen off schedule last appointment for chest discomfort she underwent cardiac catheterization revealing nonobstructive coronary disease.  Recent cardiac monitor with no major arrhythmia    Interval history  history of premature atrial contractions premature ventricular contractions dissection of right right carotid artery bradycardia LVH.  Patiently formally seen by Dr. Iam Oconnor.  Seen in [place] emergency department 9/2022 for palpitations and chest discomfort.  Note reviewed from Dr. Paddy Martínez MD 9/28/2022    Cardiac history:  Holter monitor 8/26/2021 minimum heart rate 39 beats a minute average heart rate 57 bpm max heart rate 130 bpm  9/2020 echocardiogram ejection fraction 55 to 60% grade 1 diastolic function patent foramen ovale with right-to-left shunting.  Mild aortic regurgitation  [id number] Sinus  Bradycardia  - frequent PAC s# PACs = 3Old anterior infarct.Low voltage with rightward P-axis and rotation -possible pulmonary disease.   11/15/2023 through 12/1/2023 extended Holter monitor no atrial fibrillation detected  12/18/2023 cardiac catheterization mild nonobstructive coronary disease normal LV function       Assessment:   Coronary disease  Mild nonobstructive coronary disease  ? Spasm  Palpitations  Holter monitoring with no arrhyhtmia   The patient has a wearable cardiac monitoring device.  A demonstration was given on use.  Additionally, patient given instructions on transmitting data for review through the [employer].    Hx of Occlusion of the right carotid terminus with several

## 2024-04-17 ENCOUNTER — APPOINTMENT (OUTPATIENT)
Dept: CT IMAGING | Age: 59
End: 2024-04-17
Payer: COMMERCIAL

## 2024-04-17 ENCOUNTER — HOSPITAL ENCOUNTER (EMERGENCY)
Age: 59
Discharge: HOME | End: 2024-04-17
Attending: EMERGENCY MEDICINE
Payer: COMMERCIAL

## 2024-04-17 ENCOUNTER — APPOINTMENT (OUTPATIENT)
Age: 59
End: 2024-04-17
Payer: COMMERCIAL

## 2024-04-17 VITALS
RESPIRATION RATE: 16 BRPM | HEART RATE: 59 BPM | SYSTOLIC BLOOD PRESSURE: 142 MMHG | DIASTOLIC BLOOD PRESSURE: 70 MMHG | TEMPERATURE: 97.8 F | BODY MASS INDEX: 29.02 KG/M2 | HEIGHT: 64 IN | WEIGHT: 170 LBS | OXYGEN SATURATION: 97 %

## 2024-04-17 DIAGNOSIS — R20.0 LEFT FACIAL NUMBNESS: Primary | ICD-10-CM

## 2024-04-17 LAB
ANION GAP SERPL CALC-SCNC: 11 MMOL/L (ref 2–11)
APTT PPP: 31.3 SEC (ref 23.3–37.4)
BASOPHILS # BLD: 0.1 K/UL (ref 0–0.2)
BASOPHILS NFR BLD: 1 % (ref 0–2)
BUN SERPL-MCNC: 21 MG/DL (ref 6–23)
CALCIUM SERPL-MCNC: 9.7 MG/DL (ref 8.3–10.4)
CHLORIDE SERPL-SCNC: 108 MMOL/L (ref 98–107)
CHOLEST SERPL-MCNC: 139 MG/DL
CO2 SERPL-SCNC: 24 MMOL/L (ref 21–32)
CREAT SERPL-MCNC: 0.64 MG/DL (ref 0.6–1)
DIFFERENTIAL METHOD BLD: NORMAL
EOSINOPHIL # BLD: 0.1 K/UL (ref 0–0.8)
EOSINOPHIL NFR BLD: 2 % (ref 0.5–7.8)
ERYTHROCYTE [DISTWIDTH] IN BLOOD BY AUTOMATED COUNT: 13.1 % (ref 11.9–14.6)
EST. AVERAGE GLUCOSE BLD GHB EST-MCNC: 123 MG/DL
GLUCOSE SERPL-MCNC: 110 MG/DL (ref 65–100)
HBA1C MFR BLD: 5.9 % (ref 4.8–5.6)
HCT VFR BLD AUTO: 44.6 % (ref 35.8–46.3)
HDLC SERPL-MCNC: 57 MG/DL (ref 40–60)
HDLC SERPL: 2.4
HGB BLD-MCNC: 15.1 G/DL (ref 11.7–15.4)
IMM GRANULOCYTES # BLD AUTO: 0 K/UL (ref 0–0.5)
IMM GRANULOCYTES NFR BLD AUTO: 0 % (ref 0–5)
INR PPP: 1
LDLC SERPL CALC-MCNC: 69 MG/DL
LYMPHOCYTES # BLD: 1.4 K/UL (ref 0.5–4.6)
LYMPHOCYTES NFR BLD: 30 % (ref 13–44)
MCH RBC QN AUTO: 30.3 PG (ref 26.1–32.9)
MCHC RBC AUTO-ENTMCNC: 33.9 G/DL (ref 31.4–35)
MCV RBC AUTO: 89.4 FL (ref 82–102)
MONOCYTES # BLD: 0.3 K/UL (ref 0.1–1.3)
MONOCYTES NFR BLD: 8 % (ref 4–12)
NEUTS SEG # BLD: 2.7 K/UL (ref 1.7–8.2)
NEUTS SEG NFR BLD: 59 % (ref 43–78)
NRBC # BLD: 0 K/UL (ref 0–0.2)
PLATELET # BLD AUTO: 200 K/UL (ref 150–450)
PMV BLD AUTO: 9.8 FL (ref 9.4–12.3)
POTASSIUM SERPL-SCNC: 4.4 MMOL/L (ref 3.5–5.1)
PROTHROMBIN TIME: 12.9 SEC (ref 11.3–14.9)
RBC # BLD AUTO: 4.99 M/UL (ref 4.05–5.2)
SODIUM SERPL-SCNC: 143 MMOL/L (ref 133–143)
TRIGL SERPL-MCNC: 65 MG/DL (ref 35–150)
VLDLC SERPL CALC-MCNC: 13 MG/DL (ref 6–23)
WBC # BLD AUTO: 4.6 K/UL (ref 4.3–11.1)

## 2024-04-17 PROCEDURE — 70551 MRI BRAIN STEM W/O DYE: CPT

## 2024-04-17 PROCEDURE — 83036 HEMOGLOBIN GLYCOSYLATED A1C: CPT

## 2024-04-17 PROCEDURE — 80048 BASIC METABOLIC PNL TOTAL CA: CPT

## 2024-04-17 PROCEDURE — 80061 LIPID PANEL: CPT

## 2024-04-17 PROCEDURE — 94762 N-INVAS EAR/PLS OXIMTRY CONT: CPT

## 2024-04-17 PROCEDURE — 70498 CT ANGIOGRAPHY NECK: CPT

## 2024-04-17 PROCEDURE — 85730 THROMBOPLASTIN TIME PARTIAL: CPT

## 2024-04-17 PROCEDURE — 6370000000 HC RX 637 (ALT 250 FOR IP): Performed by: STUDENT IN AN ORGANIZED HEALTH CARE EDUCATION/TRAINING PROGRAM

## 2024-04-17 PROCEDURE — 6360000004 HC RX CONTRAST MEDICATION: Performed by: EMERGENCY MEDICINE

## 2024-04-17 PROCEDURE — 70450 CT HEAD/BRAIN W/O DYE: CPT

## 2024-04-17 PROCEDURE — 99285 EMERGENCY DEPT VISIT HI MDM: CPT

## 2024-04-17 PROCEDURE — 85610 PROTHROMBIN TIME: CPT

## 2024-04-17 PROCEDURE — 85025 COMPLETE CBC W/AUTO DIFF WBC: CPT

## 2024-04-17 RX ORDER — CLOPIDOGREL BISULFATE 75 MG/1
300 TABLET ORAL ONCE
Status: COMPLETED | OUTPATIENT
Start: 2024-04-17 | End: 2024-04-17

## 2024-04-17 RX ORDER — CLOPIDOGREL BISULFATE 75 MG/1
75 TABLET ORAL DAILY
Status: DISCONTINUED | OUTPATIENT
Start: 2024-04-18 | End: 2024-04-17 | Stop reason: HOSPADM

## 2024-04-17 RX ORDER — CLOPIDOGREL BISULFATE 75 MG/1
75 TABLET ORAL DAILY
Qty: 21 TABLET | Refills: 0 | Status: SHIPPED | OUTPATIENT
Start: 2024-04-17 | End: 2024-05-08

## 2024-04-17 RX ADMIN — CLOPIDOGREL BISULFATE 300 MG: 75 TABLET ORAL at 13:35

## 2024-04-17 RX ADMIN — IOPAMIDOL 60 ML: 755 INJECTION, SOLUTION INTRAVENOUS at 11:59

## 2024-04-17 ASSESSMENT — ENCOUNTER SYMPTOMS
NAUSEA: 1
VOMITING: 0
ABDOMINAL PAIN: 0
COUGH: 0
SORE THROAT: 0
SHORTNESS OF BREATH: 0
EYE PAIN: 0
PHOTOPHOBIA: 1
BACK PAIN: 0

## 2024-04-17 ASSESSMENT — LIFESTYLE VARIABLES
HOW OFTEN DO YOU HAVE A DRINK CONTAINING ALCOHOL: 2-4 TIMES A MONTH
HOW MANY STANDARD DRINKS CONTAINING ALCOHOL DO YOU HAVE ON A TYPICAL DAY: 1 OR 2

## 2024-04-17 ASSESSMENT — PAIN SCALES - GENERAL: PAINLEVEL_OUTOF10: 0

## 2024-04-17 NOTE — ED PROVIDER NOTES
Result   No CT evidence of acute intracranial abnormality.      CTA HEAD NECK W WO CONTRAST   Final Result   1. There is a long segment of stenosis involving the right internal carotid   artery proximally, involving the entire course of the vessel into the   bifurcation. Stenosis is 60 to 70%. The right A1 and M1 segments are severely   stenotic and may be occluded. The A2 segment right side is reconstituted at the   right M1 and M2 branches are highly stenotic and may be occluded, with several   collateral vessels present.   2. The entire left carotid systems, and Eek of [place] is intact.           NIH Stroke Scale  Interval: Baseline  Level of Consciousness (1a): Alert  LOC Questions (1b): Answers both correctly  LOC Commands (1c): Performs both tasks correctly  Best Gaze (2): Normal  Visual (3): No visual loss  Facial Palsy (4): Normal symmetrical movement  Motor Arm, Left (5a): No drift  Motor Arm, Right (5b): No drift  Motor Leg, Left (6a): No drift  Motor Leg, Right (6b): No drift  Limb Ataxia (7): Absent  Sensory (8): Normal  Best Language (9): No aphasia  Dysarthria (10): Normal  Extinction and Inattention (11): No abnormality  Total: 0           ED Course as of 04/17/24 1459   Wed Apr 17, 2024   1305 Patient is resting comfortably in bed.  She still has the paresthesias to her left cheek but no other complaints.  I updated her on the results and plan so far.  I am going to contact neurology about her symptoms [CW]   1320 I spoke with Dr Cottrell of Neurology. He recommends obtaining a stat brain MRI to evaluate for acute ischemic stroke.  He recommends a Plavix loading dose of 300 today and then 75 mg daily for the next 3 weeks.  He also recommends starting Lipitor 80 mg daily.  If patient's brain MRI is negative for acute stroke, then patient can follow-up in the neurology clinic.  If it is positive for acute stroke, patient will need to be admitted.  He wants hemoglobin A1c and lipid panel checked but

## 2024-04-17 NOTE — ED TRIAGE NOTES
Pt ambulatory to triage with steady gait. Pt reports yesterday having what she believed to be an ocular migraine with \"wavey vision in the left eye\". Pt reports today she woke up around 0900 with tingly/numbness/tightness on left side of face. 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 Pts HR  in triage. Pt moved to room 8.

## 2024-04-18 DIAGNOSIS — I65.29 INTRACRANIAL CAROTID STENOSIS, UNSPECIFIED LATERALITY: Primary | ICD-10-CM

## 2024-05-29 ENCOUNTER — PATIENT MESSAGE (OUTPATIENT)
Age: 59
End: 2024-05-29

## 2024-05-30 ENCOUNTER — TELEPHONE (OUTPATIENT)
Age: 59
End: 2024-05-30

## 2024-05-30 NOTE — TELEPHONE ENCOUNTER
The images too small to interpret.  The heart rates look controlled.  Please ask if the patient can send through [employer] with images attached to the message.

## 2024-05-30 NOTE — TELEPHONE ENCOUNTER
Rhythm strips are difficult to interpret due to artifact.  Rates appear controlled.  If there are any symptoms then a cardiac monitor can be placed.

## 2024-05-30 NOTE — TELEPHONE ENCOUNTER
Informed pt of Dr. Chin's response. Reviewed if sx stable keep f/u Mon. If sx exacerbate or concerning sx in interim, to ER.  Pt voiced understanding and agreement with POC.  cgh

## 2024-05-30 NOTE — TELEPHONE ENCOUNTER
Mao Chin MD  You11 minutes ago (4:35 PM)       Rhythm strips are difficult to interpret due to artifact.  Rates appear controlled.  If there are any symptoms then a cardiac monitor can be placed.         Note      Informed pt of Dr. Chin's response.   Pt reports multiple issues/ concerns noted per [facility] notes. Advise if sx stable keep f/u Mon. Any exacerbation of sx or concerning sx to ER in interim.  Pt voiced understanding. cgh

## 2024-05-30 NOTE — TELEPHONE ENCOUNTER
----- Message from Clotilde Stewart MA sent at 5/30/2024 10:18 AM EDT -----  Regarding: FW: Ecg  Contact: [phone]    ----- Message -----  From: Chano Packer MA  Sent: 5/30/2024   8:03 AM EDT  To: Clotilde Stewart MA  Subject: FW: Ecg                                            ----- Message -----  From: Jess Wahl  Sent: 5/29/2024   7:02 PM EDT  To: [facility] Clinical Staff  Subject: Ecg                                              Feeling flutters and hard beats. Took several ecgs and come back poor reading or inconclusive. After recent heart monitor a bit concerned. Have an appt with you on Monday.

## 2024-06-03 ENCOUNTER — OFFICE VISIT (OUTPATIENT)
Age: 59
End: 2024-06-03
Payer: COMMERCIAL

## 2024-06-03 VITALS
BODY MASS INDEX: 29.12 KG/M2 | HEIGHT: 64 IN | WEIGHT: 170.6 LBS | DIASTOLIC BLOOD PRESSURE: 72 MMHG | HEART RATE: 54 BPM | SYSTOLIC BLOOD PRESSURE: 110 MMHG

## 2024-06-03 DIAGNOSIS — R00.2 PALPITATIONS: ICD-10-CM

## 2024-06-03 DIAGNOSIS — I63.9 CEREBROVASCULAR ACCIDENT (CVA), UNSPECIFIED MECHANISM (HCC): ICD-10-CM

## 2024-06-03 DIAGNOSIS — E78.5 HYPERLIPIDEMIA LDL GOAL <70: Primary | ICD-10-CM

## 2024-06-03 DIAGNOSIS — I25.10 ATHEROSCLEROSIS OF NATIVE CORONARY ARTERY OF NATIVE HEART WITHOUT ANGINA PECTORIS: ICD-10-CM

## 2024-06-03 PROCEDURE — 99214 OFFICE O/P EST MOD 30 MIN: CPT | Performed by: INTERNAL MEDICINE

## 2024-06-03 RX ORDER — PITAVASTATIN CALCIUM 4.18 MG/1
1 TABLET, FILM COATED ORAL
COMMUNITY
Start: 2024-02-15 | End: 2025-02-14

## 2024-06-03 ASSESSMENT — ENCOUNTER SYMPTOMS
STRIDOR: 0
COUGH: 0
APHONIA: 0
EYE PAIN: 0
ABDOMINAL PAIN: 0
NAIL CHANGES: 0

## 2024-06-03 NOTE — PROGRESS NOTES
has been evaluated by vascular medicine and vascular surgery as well as neurosurgery within the [facility] system.  Etiology of her previous stroke is unknown.  She is not a candidate for revascularization from a surgical standpoint.  A 30-day monitor did not reveal an arrhythmia.  She continues to have palpitations as well as irregular heart rhythms on her wearable device [Apple watch].  We discussed possibility of atrial fibrillation as underlying cause and implantable loop recorder is planned  Coronary disease  Mild nonobstructive coronary disease  Possibly related to coronary spasm  Palpitations  Patient has a wearable device we have discussed transmitting through MyChart  Hx of Occlusion of the right carotid terminus with several collateral vessels noted in the MCA territory suggesting chronic occlusion.  She is followed by neurology and vascular medicine in the [facility] system  Hyperlipidemia  This patient does not have an active medication from one of the medication groupers.   Lipids reviewed from 2023 LDL 61   HTN  Diabetes with hyperglycemia   SGL 2 therapy  Mounjaro   Statin intolerance   Was on livalo   Taken off by pcp     Current Outpatient Medications   Medication Sig    Tirzepatide (MOUNJARO) 7.5 MG/0.5ML SOPN SC injection Inject 0.5 mLs into the skin once a week    traZODone (DESYREL) 50 MG tablet TAKE 1 TO 2 TABLETS(50  MG) BY MOUTH AT NIGHT AS NEEDED FOR SLEEP    aspirin 81 MG chewable tablet Take 1 tablet by mouth daily    Cetirizine HCl 10 MG CAPS Take by mouth    citalopram (CELEXA) 20 MG tablet TAKE 1 TABLET BY MOUTH DAILY    fluticasone (FLONASE) 50 MCG/ACT nasal spray 2 sprays by Nasal route 2 times daily    clopidogrel (PLAVIX) 75 MG tablet Take 1 tablet by mouth daily for 21 days    metoprolol succinate (TOPROL XL) 25 MG extended release tablet Take 1 tablet by mouth daily    nitroGLYCERIN (NITROSTAT) 0.4 MG SL tablet Place 1 tablet under the tongue every 5 minutes as needed for Chest pain

## 2024-06-04 PROBLEM — R00.2 PALPITATIONS: Status: ACTIVE | Noted: 2024-06-03

## 2024-06-25 ENCOUNTER — TELEPHONE (OUTPATIENT)
Age: 59
End: 2024-06-25

## 2024-06-26 NOTE — TELEPHONE ENCOUNTER
I called the patient and left a message for her or her  to call me back to review the FMLA forms I received on him.

## 2024-06-26 NOTE — TELEPHONE ENCOUNTER
I received FMLA forms for patient's  to provide care to patient as needed. The request is to provide care after any medical procedures regarding her heart that may arise.

## 2024-06-27 ENCOUNTER — TELEPHONE (OUTPATIENT)
Age: 59
End: 2024-06-27

## 2024-06-27 DIAGNOSIS — I77.71 DISSECTION OF RIGHT CAROTID ARTERY (HCC): ICD-10-CM

## 2024-06-27 DIAGNOSIS — R00.1 BRADYCARDIA: Primary | ICD-10-CM

## 2024-06-27 DIAGNOSIS — I49.3 PVC'S (PREMATURE VENTRICULAR CONTRACTIONS): ICD-10-CM

## 2024-06-27 DIAGNOSIS — R00.1 BRADYCARDIA: ICD-10-CM

## 2024-06-27 DIAGNOSIS — I20.0 ACCELERATING ANGINA (HCC): ICD-10-CM

## 2024-06-27 LAB
BASOPHILS # BLD: 0.1 K/UL (ref 0–0.2)
BASOPHILS NFR BLD: 1 % (ref 0–2)
DIFFERENTIAL METHOD BLD: NORMAL
EOSINOPHIL # BLD: 0.1 K/UL (ref 0–0.8)
EOSINOPHIL NFR BLD: 2 % (ref 0.5–7.8)
ERYTHROCYTE [DISTWIDTH] IN BLOOD BY AUTOMATED COUNT: 13.4 % (ref 11.9–14.6)
HCT VFR BLD AUTO: 42.4 % (ref 35.8–46.3)
HGB BLD-MCNC: 13.8 G/DL (ref 11.7–15.4)
IMM GRANULOCYTES # BLD AUTO: 0 K/UL (ref 0–0.5)
IMM GRANULOCYTES NFR BLD AUTO: 0 % (ref 0–5)
LYMPHOCYTES # BLD: 1.2 K/UL (ref 0.5–4.6)
LYMPHOCYTES NFR BLD: 29 % (ref 13–44)
MAGNESIUM SERPL-MCNC: 1.8 MG/DL (ref 1.8–2.4)
MCH RBC QN AUTO: 29.7 PG (ref 26.1–32.9)
MCHC RBC AUTO-ENTMCNC: 32.5 G/DL (ref 31.4–35)
MCV RBC AUTO: 91.2 FL (ref 82–102)
MONOCYTES # BLD: 0.4 K/UL (ref 0.1–1.3)
MONOCYTES NFR BLD: 9 % (ref 4–12)
NEUTS SEG # BLD: 2.6 K/UL (ref 1.7–8.2)
NEUTS SEG NFR BLD: 59 % (ref 43–78)
NRBC # BLD: 0 K/UL (ref 0–0.2)
PLATELET # BLD AUTO: 184 K/UL (ref 150–450)
PMV BLD AUTO: 9.8 FL (ref 9.4–12.3)
RBC # BLD AUTO: 4.65 M/UL (ref 4.05–5.2)
WBC # BLD AUTO: 4.3 K/UL (ref 4.3–11.1)

## 2024-06-27 NOTE — TELEPHONE ENCOUNTER
Pt called back. I told her Dr Chin recommends FMLA go through her Neurologist or PCP since we do not treat Stroke. Both patient and  verbalized understanding. Patient states she will take the forms to either the Neurologist or Vascular Surgeon.     The forms were left at the  for the patient to .

## 2024-06-27 NOTE — TELEPHONE ENCOUNTER
I spoke to the pt and her . He is requesting intermittent FMLA to take patient to appointments, procedures, or any hospitalizations if needed due to her hx of Stroke. Pt is scheduled for a Loop recorder insert tomorrow 6/28/24.    I will review with Dr Chin

## 2024-06-27 NOTE — TELEPHONE ENCOUNTER
I spoke to the pt and her . He is requesting intermittent FMLA to take patient to appointments, procedures, or any hospitalizations if needed due to her hx of Stroke. Pt is scheduled for a Loop recorder insert tomorrow 6/28/24.     I will review with Dr Giuseppe HOLGUIN    6/27/24  2:04 PM  Note     Per Dr Chin, since we do not treat patient's for stroke, he recommends intermittent FMLA go through PCP or Neurology.      I called the pt and left a message for her to call me back.

## 2024-06-27 NOTE — TELEPHONE ENCOUNTER
Pt notified of Dr Chin's response to FMLA. I told her we would be happy to give her  a note for work for her procedure tomorrow, but as for FMLA, that will need to go through PCP or Neurology.     Pt states she understands. I left the FMLA forms at the . Pt states she will  the forms on Monday

## 2024-06-27 NOTE — TELEPHONE ENCOUNTER
Per Dr Chin, since we do not treat patient's for stroke, he recommends intermittent FMLA go through PCP or Neurology.     I called the pt and left a message for her to call me back.

## 2024-06-27 NOTE — PROGRESS NOTES
Patient pre-assessment complete for LOOP scheduled for Dr Chin, arrival time 0600. Patient verified using . PNPO status reinforced. Instructed they can take all other medications excluding vitamins & supplements. Patient verbalizes understanding of all instructions & denies any questions at this time.

## 2024-06-28 ENCOUNTER — HOSPITAL ENCOUNTER (OUTPATIENT)
Age: 59
Setting detail: OUTPATIENT SURGERY
Discharge: HOME | End: 2024-06-28
Attending: INTERNAL MEDICINE | Admitting: INTERNAL MEDICINE
Payer: COMMERCIAL

## 2024-06-28 VITALS
OXYGEN SATURATION: 95 % | HEIGHT: 64 IN | BODY MASS INDEX: 29.02 KG/M2 | DIASTOLIC BLOOD PRESSURE: 81 MMHG | RESPIRATION RATE: 18 BRPM | SYSTOLIC BLOOD PRESSURE: 140 MMHG | TEMPERATURE: 98 F | HEART RATE: 59 BPM | WEIGHT: 170 LBS

## 2024-06-28 DIAGNOSIS — R00.2 PALPITATIONS: ICD-10-CM

## 2024-06-28 PROCEDURE — 33285 INSJ SUBQ CAR RHYTHM MNTR: CPT | Performed by: INTERNAL MEDICINE

## 2024-06-28 PROCEDURE — C1764 EVENT RECORDER, CARDIAC: HCPCS | Performed by: INTERNAL MEDICINE

## 2024-06-28 PROCEDURE — 2500000003 HC RX 250 WO HCPCS: Performed by: INTERNAL MEDICINE

## 2024-06-28 PROCEDURE — 2709999900 HC NON-CHARGEABLE SUPPLY: Performed by: INTERNAL MEDICINE

## 2024-06-28 DEVICE — INSERTABLE CARDIAC MONITOR
Type: IMPLANTABLE DEVICE | Site: CHEST  WALL | Status: FUNCTIONAL
Brand: LUX-DX II+™

## 2024-06-28 RX ORDER — LIDOCAINE HYDROCHLORIDE AND EPINEPHRINE 10; 10 MG/ML; UG/ML
INJECTION, SOLUTION INFILTRATION; PERINEURAL PRN
Status: DISCONTINUED | OUTPATIENT
Start: 2024-06-28 | End: 2024-06-28 | Stop reason: HOSPADM

## 2024-06-28 NOTE — PROGRESS NOTES
Discharge instructions reviewed with patient including post LOOP implant care. INT removed with cath intact.

## 2024-06-28 NOTE — PROGRESS NOTES
Patient received to CPRU room # 11.  Ambulatory from [facility]. Patient scheduled for Loop today with Dr Chin. Procedure reviewed & questions answered, voiced good understanding consent obtained & placed on chart. All medications and medical history reviewed. Will prep patient per orders. Patient & family updated on plan of care.      The patient has a fraility score of 3-MANAGING WELL, based on pt ability to ambulate independently and to complete own ADLs.

## 2024-06-28 NOTE — DISCHARGE INSTRUCTIONS
LOOP MONITOR INSTRUCTIONS SHEET      Activity  As tolerated and directed by your doctor  Bathe or shower as directed by your doctor    Diet  Resume your previous diet     Pain   Call your doctor if pain is NOT relieved by OTC medication    Dressing Care: Leave dressing on the incision mid chest . If dressing becomes loose,  You may remove it. Keep area Clean & dry, Do not get incision wet or  let water run over incision. Do not apply any lotions, creams or powder to incision.    Follow-Up Phone Calls  Will be made nursing staff  If you have any problems, call your doctor as needed    Call your doctor if  Excessive bleeding that does not stop after holding mild pressure over the area  Temperature of 101 degrees F or above  Redness,excessive swelling or bruising, and/or green or yellow, smelly discharge from incision    After Anesthesia  For the first 24 hours: DO NOT Drive, Drink alcoholic beverages, or Make important decisions  Be aware of dizziness following anesthesia and while taking pain medication    Medications - Continue home medications as previously prescribed     Other Instructions- Always show the doctor or dentist your loop recorder identification card.      Appointment date/time - ***    Your doctor's phone number - ***

## 2024-06-29 LAB — ECHO BSA: 1.87 M2

## 2024-07-10 ENCOUNTER — NURSE ONLY (OUTPATIENT)
Age: 59
End: 2024-07-10

## 2024-07-10 DIAGNOSIS — R00.1 BRADYCARDIA: Primary | ICD-10-CM

## 2024-08-13 ENCOUNTER — OFFICE VISIT (OUTPATIENT)
Age: 59
End: 2024-08-13
Payer: COMMERCIAL

## 2024-08-13 VITALS
HEART RATE: 52 BPM | DIASTOLIC BLOOD PRESSURE: 72 MMHG | BODY MASS INDEX: 29.53 KG/M2 | SYSTOLIC BLOOD PRESSURE: 126 MMHG | HEIGHT: 64 IN | WEIGHT: 173 LBS

## 2024-08-13 DIAGNOSIS — G47.10 HYPERSOMNOLENCE: ICD-10-CM

## 2024-08-13 DIAGNOSIS — Z78.9 STATIN INTOLERANCE: ICD-10-CM

## 2024-08-13 DIAGNOSIS — I49.1 PREMATURE ATRIAL CONTRACTIONS: ICD-10-CM

## 2024-08-13 DIAGNOSIS — R00.1 BRADYCARDIA: Primary | ICD-10-CM

## 2024-08-13 DIAGNOSIS — E78.5 HYPERLIPIDEMIA LDL GOAL <70: ICD-10-CM

## 2024-08-13 PROCEDURE — 99214 OFFICE O/P EST MOD 30 MIN: CPT | Performed by: INTERNAL MEDICINE

## 2024-08-13 ASSESSMENT — ENCOUNTER SYMPTOMS
STRIDOR: 0
ABDOMINAL PAIN: 0
EYE PAIN: 0
COUGH: 0
APHONIA: 0

## 2024-08-13 NOTE — PROGRESS NOTES
[address], [address]  [place] [address]  PHONE: [phone]    SUBJECTIVE:   Jess Wahl is a 59 y.o. female 1965   seen for a follow up visit regarding the following:     Chief Complaint   Patient presents with    Bradycardia           History of present illness: 59 y.o. female presented for follow-up 8/13/24 Patient with PAC on her loop recorder. No AF. Has had pain in her right neck.  She said frequent palpitations some days better than others.  Patient stated that she is tired on most days and has been noted to snore by her .  Interval history  she was hospitalized in the [facility] system in April 2024 due to acute left facial weakness she was found have right basal ganglia infarction on MRI right cervical stenosis 60 to 70% stenosis.  Vascular surgery was consulted surgical therapy was deferred etiology of stroke presumably due to large atherosclerotic plaque.  She wore a 30-day cardiac monitor with no arrhythmias.  She has an Apple Watch in place and has had irregular rhythms concerning for atrial fibrillation.      Interval history  history of premature atrial contractions premature ventricular contractions dissection of right right carotid artery bradycardia LVH.  Patiently formally seen by Dr. Iam Oconnor.  Seen in [place] emergency department 9/2022 for palpitations and chest discomfort.  Note reviewed from Dr. Paddy Martínez MD 9/28/2022    Cardiac history:  Holter monitor 8/26/2021 minimum heart rate 39 beats a minute average heart rate 57 bpm max heart rate 130 bpm  9/2020 echocardiogram ejection fraction 55 to 60% grade 1 diastolic function patent foramen ovale with right-to-left shunting.  Mild aortic regurgitation  [id number] Sinus  Bradycardia  - frequent PAC s# PACs = 3Old anterior infarct.Low voltage with rightward P-axis and rotation -possible pulmonary disease.   11/15/2023 through 12/1/2023 extended Holter monitor no atrial fibrillation

## 2024-09-04 ENCOUNTER — HOSPITAL ENCOUNTER (OUTPATIENT)
Dept: SLEEP CENTER | Age: 59
Discharge: HOME | End: 2024-09-07

## 2024-09-30 ENCOUNTER — TELEPHONE (OUTPATIENT)
Dept: SLEEP MEDICINE | Age: 59
End: 2024-09-30

## 2024-10-03 NOTE — PROGRESS NOTES
[facility]  [address]  [place], [place] [address] [phone]    Patient Name:  Jess Wahl  YOB: 1965    Telehealth encounter is a billable service, with coverage as determined by the insurance carrier.    The patient was located at Home: [address]  [facility] [address]  The provider was located at Home (City/State): [place], [place]       Services were provided through a video synchronous discussion virtually to substitute for in-person clinic visit.    Jess Wahl is a 59 y.o. female who was seen by synchronous (real-time) audio-video technology on 10/4/2024.      Consent:  She and/or her healthcare decision maker is aware that this patient-initiated Telehealth encounter is a billable service, with coverage as determined by her insurance carrier. She is aware that she may receive a bill and has provided verbal consent to proceed: Yes    Patient Name:  Jess Wahl  YOB: 1965      Office Visit 10/4/2024    CHIEF COMPLAINT:    Chief Complaint   Patient presents with    New Patient         HISTORY OF PRESENT ILLNESS:  Patient is an 59 y.o. female seen today for new pt evaluation.  Pt had a PSG/HST on 9/4/24 with an AHI of 5/hr with desaturations to 84%.  Pt reports that she has been having issues with PACs for a while. They have caused her to be extremely fatigued. Her  also reports that she snores. She usually sleeps through the night. She is on trazodone 100mg nightly. Some nights, she will still have trouble falling asleep and staying asleep. It will take her about 1 hour to fall asleep even with trazodone. She takes it about 30 mins prior to going to bed. Pt has tried melatonin and benadryl but they do not work for her.   She reports that her energy is usually pretty good when she is not having a lot of heart flutters. If she is having heart flutters, she will feel lightheaded and very tired.  She is  and

## 2024-10-04 ENCOUNTER — TELEMEDICINE (OUTPATIENT)
Dept: SLEEP MEDICINE | Age: 59
End: 2024-10-04
Payer: COMMERCIAL

## 2024-10-04 DIAGNOSIS — G47.00 INSOMNIA, UNSPECIFIED TYPE: ICD-10-CM

## 2024-10-04 DIAGNOSIS — G47.33 OSA (OBSTRUCTIVE SLEEP APNEA): Primary | ICD-10-CM

## 2024-10-04 DIAGNOSIS — G47.34 NOCTURNAL HYPOXEMIA: ICD-10-CM

## 2024-10-04 PROCEDURE — 99203 OFFICE O/P NEW LOW 30 MIN: CPT | Performed by: PHYSICIAN ASSISTANT

## 2024-10-04 RX ORDER — TEMAZEPAM 15 MG/1
15 CAPSULE ORAL NIGHTLY
Qty: 30 CAPSULE | Refills: 5 | Status: SHIPPED | OUTPATIENT
Start: 2024-10-04 | End: 2025-04-02

## 2024-10-04 RX ORDER — ACYCLOVIR 400 MG/1
TABLET ORAL
COMMUNITY
Start: 2024-07-24

## 2024-10-04 ASSESSMENT — SLEEP AND FATIGUE QUESTIONNAIRES
HOW LIKELY ARE YOU TO NOD OFF OR FALL ASLEEP WHILE SITTING QUIETLY AFTER LUNCH WITHOUT ALCOHOL: WOULD NEVER DOZE
HOW LIKELY ARE YOU TO NOD OFF OR FALL ASLEEP WHILE SITTING INACTIVE IN A PUBLIC PLACE: WOULD NEVER DOZE
HOW LIKELY ARE YOU TO NOD OFF OR FALL ASLEEP WHILE SITTING AND TALKING TO SOMEONE: WOULD NEVER DOZE
HOW LIKELY ARE YOU TO NOD OFF OR FALL ASLEEP WHILE LYING DOWN TO REST IN THE AFTERNOON WHEN CIRCUMSTANCES PERMIT: HIGH CHANCE OF DOZING
HOW LIKELY ARE YOU TO NOD OFF OR FALL ASLEEP WHILE SITTING AND READING: WOULD NEVER DOZE
HOW LIKELY ARE YOU TO NOD OFF OR FALL ASLEEP IN A CAR, WHILE STOPPED FOR A FEW MINUTES IN TRAFFIC: WOULD NEVER DOZE
HOW LIKELY ARE YOU TO NOD OFF OR FALL ASLEEP WHEN YOU ARE A PASSENGER IN A CAR FOR AN HOUR WITHOUT A BREAK: HIGH CHANCE OF DOZING
HOW LIKELY ARE YOU TO NOD OFF OR FALL ASLEEP WHILE WATCHING TV: WOULD NEVER DOZE

## 2024-10-07 PROCEDURE — 93298 REM INTERROG DEV EVAL SCRMS: CPT | Performed by: INTERNAL MEDICINE

## 2024-10-24 ENCOUNTER — PATIENT MESSAGE (OUTPATIENT)
Dept: SLEEP MEDICINE | Age: 59
End: 2024-10-24

## 2024-10-24 DIAGNOSIS — G47.00 INSOMNIA, UNSPECIFIED TYPE: Primary | ICD-10-CM

## 2024-10-28 RX ORDER — TEMAZEPAM 22.5 MG/1
22.5 CAPSULE ORAL NIGHTLY
Qty: 30 CAPSULE | Refills: 5 | Status: SHIPPED | OUTPATIENT
Start: 2024-10-28 | End: 2025-04-26

## 2024-11-07 PROCEDURE — 93298 REM INTERROG DEV EVAL SCRMS: CPT | Performed by: INTERNAL MEDICINE

## 2024-11-12 ENCOUNTER — TELEPHONE (OUTPATIENT)
Age: 59
End: 2024-11-12

## 2024-11-12 NOTE — TELEPHONE ENCOUNTER
Called patient and had her manually send transmission.  Loop showing normal function with  symptom activated episode showing sinus rhythm with some ectopy (pac's).  No leonardo.  No pauses.
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
## 2024-11-12 NOTE — TELEPHONE ENCOUNTER
I spoke w/pt.told her device response on loop recorder  at time of event.She said BP was 110/84 right before the event.She is not on any BP medication.Pt.is wondering if having something to increase her BP might help?

## 2024-11-13 ENCOUNTER — OFFICE VISIT (OUTPATIENT)
Age: 59
End: 2024-11-13
Payer: COMMERCIAL

## 2024-11-13 VITALS — WEIGHT: 173 LBS | SYSTOLIC BLOOD PRESSURE: 110 MMHG | DIASTOLIC BLOOD PRESSURE: 70 MMHG | BODY MASS INDEX: 29.7 KG/M2

## 2024-11-13 DIAGNOSIS — Z78.9 STATIN INTOLERANCE: ICD-10-CM

## 2024-11-13 DIAGNOSIS — E78.5 HYPERLIPIDEMIA LDL GOAL <70: ICD-10-CM

## 2024-11-13 DIAGNOSIS — R55 SYNCOPE, UNSPECIFIED SYNCOPE TYPE: ICD-10-CM

## 2024-11-13 DIAGNOSIS — R00.1 BRADYCARDIA: Primary | ICD-10-CM

## 2024-11-13 PROCEDURE — 99214 OFFICE O/P EST MOD 30 MIN: CPT | Performed by: INTERNAL MEDICINE

## 2024-11-13 RX ORDER — EZETIMIBE 10 MG/1
10 TABLET ORAL DAILY
Qty: 90 TABLET | Refills: 3 | Status: SHIPPED | OUTPATIENT
Start: 2024-11-13

## 2024-11-13 NOTE — PROGRESS NOTES
patient today.           No results found for this or any previous visit (from the past 672 hour(s)).  Lab Results   Component Value Date/Time    CHOL 139 04/17/2024 11:11 AM    HDL 57 04/17/2024 11:11 AM    LDL 69 04/17/2024 11:11 AM    VLDL 13 04/17/2024 11:11 AM       No results found for any visits on 11/13/24.        FRANKY Mercado was seen today for hyperlipidemia.    Diagnoses and all orders for this visit:    Bradycardia    Hyperlipidemia LDL goal <70    Statin intolerance    Syncope, unspecified syncope type    Other orders  -     ezetimibe (ZETIA) 10 MG tablet; Take 1 tablet by mouth daily      No follow-ups on file.       Mao Chin MD  11/13/2024  8:10 AM      The patient (or guardian, if applicable) and other individuals in attendance with the patient were advised that Artificial Intelligence will be utilized during this visit to record, process the conversation to generate a clinical note, and support improvement of the AI technology. The patient (or guardian, if applicable) and other individuals in attendance at the appointment consented to the use of AI, including the recording.

## 2024-12-08 PROCEDURE — 93298 REM INTERROG DEV EVAL SCRMS: CPT | Performed by: INTERNAL MEDICINE

## 2024-12-19 ENCOUNTER — PATIENT MESSAGE (OUTPATIENT)
Age: 59
End: 2024-12-19

## 2025-04-02 NOTE — ED NOTES
I have reviewed discharge instructions with the patient. The patient verbalized understanding. Patient left ED via Discharge Method: ambulatory to Home with family    Opportunity for questions and clarification provided. Patient given 0 scripts. To continue your aftercare when you leave the hospital, you may receive an automated call from our care team to check in on how you are doing. This is a free service and part of our promise to provide the best care and service to meet your aftercare needs.  If you have questions, or wish to unsubscribe from this service please call [phone]. Thank you for Choosing our [facility] Emergency Department.         Long Beard RN  09/28/22 6590
[Negative] : Heme/Lymph
[Negative] : Heme/Lymph

## 2025-04-09 NOTE — PROGRESS NOTES
She has not had a sleep study in with the appliance in place.           4/10/2025    12:00 PM 10/4/2024     8:53 AM   Sleep Medicine   Sitting and reading 0 0   Watching TV 0 0   Sitting, inactive in a public place (e.g. a theatre or a meeting) 0 0   As a passenger in a car for an hour without a break 2 3   Lying down to rest in the afternoon when circumstances permit 2 3   Sitting and talking to someone 0 0   Sitting quietly after a lunch without alcohol 0 0   In a car, while stopped for a few minutes in traffic 0 0   [place] Sleepiness Score 4  6       Patient-reported        Past Medical History:   Diagnosis Date    Bell's palsy     CAD (coronary artery disease)     Diabetes (HCC)     19 A1c 6.7 in care everywhere; does not check regularly; denies any ss of hypo    History of anxiety     Hypertension     managed with medication    Menopause     Murmur     PMDD (premenstrual dysphoric disorder)     Rash     Granuloma Annulara       Patient Active Problem List   Diagnosis    Bradycardia    Dissection of right carotid artery    LVH (left ventricular hypertrophy)    PVC's (premature ventricular contractions)    Accelerating angina (HCC)    Palpitations           Past Surgical History:   Procedure Laterality Date    CARDIAC CATHETERIZATION      CARDIAC PROCEDURE N/A 2023    Left heart cath / coronary angiography performed by Renny Mello MD at [facility] CARDIAC CATH LAB     SECTION      x 3    CHOLECYSTECTOMY, LAPAROSCOPIC      EP DEVICE PROCEDURE N/A 2024    Loop recorder insert performed by Mao Chin MD at [facility] CARDIAC CATH LAB    HYSTERECTOMY, TOTAL ABDOMINAL (CERVIX REMOVED)      ORTHOPEDIC SURGERY Left     Lt. knee scoped    ORTHOPEDIC SURGERY Right     baker cyst right knee    RHINOPLASTY      TONSILLECTOMY      TUBAL LIGATION      WISDOM TOOTH EXTRACTION             Social History     Socioeconomic History    Marital status:      Spouse name: Not on file    Number

## 2025-04-10 ENCOUNTER — TELEMEDICINE (OUTPATIENT)
Dept: SLEEP MEDICINE | Age: 60
End: 2025-04-10
Payer: COMMERCIAL

## 2025-04-10 DIAGNOSIS — G47.00 INSOMNIA, UNSPECIFIED TYPE: ICD-10-CM

## 2025-04-10 DIAGNOSIS — G47.34 NOCTURNAL HYPOXEMIA: ICD-10-CM

## 2025-04-10 DIAGNOSIS — G47.33 OSA (OBSTRUCTIVE SLEEP APNEA): Primary | ICD-10-CM

## 2025-04-10 PROCEDURE — 99213 OFFICE O/P EST LOW 20 MIN: CPT | Performed by: PHYSICIAN ASSISTANT

## 2025-04-10 ASSESSMENT — SLEEP AND FATIGUE QUESTIONNAIRES
HOW LIKELY ARE YOU TO NOD OFF OR FALL ASLEEP IN A CAR, WHILE STOPPED FOR A FEW MINUTES IN TRAFFIC: WOULD NEVER DOZE
ESS TOTAL SCORE: 4
HOW LIKELY ARE YOU TO NOD OFF OR FALL ASLEEP WHILE SITTING AND READING: WOULD NEVER DOZE
HOW LIKELY ARE YOU TO NOD OFF OR FALL ASLEEP WHILE LYING DOWN TO REST IN THE AFTERNOON WHEN CIRCUMSTANCES PERMIT: MODERATE CHANCE OF DOZING
HOW LIKELY ARE YOU TO NOD OFF OR FALL ASLEEP WHILE WATCHING TV: WOULD NEVER DOZE
HOW LIKELY ARE YOU TO NOD OFF OR FALL ASLEEP WHEN YOU ARE A PASSENGER IN A CAR FOR AN HOUR WITHOUT A BREAK: MODERATE CHANCE OF DOZING
HOW LIKELY ARE YOU TO NOD OFF OR FALL ASLEEP IN A CAR, WHILE STOPPED FOR A FEW MINUTES IN TRAFFIC: WOULD NEVER DOZE
HOW LIKELY ARE YOU TO NOD OFF OR FALL ASLEEP WHILE LYING DOWN TO REST IN THE AFTERNOON WHEN CIRCUMSTANCES PERMIT: MODERATE CHANCE OF DOZING
HOW LIKELY ARE YOU TO NOD OFF OR FALL ASLEEP WHILE SITTING QUIETLY AFTER LUNCH WITHOUT ALCOHOL: WOULD NEVER DOZE
HOW LIKELY ARE YOU TO NOD OFF OR FALL ASLEEP WHILE SITTING AND TALKING TO SOMEONE: WOULD NEVER DOZE
HOW LIKELY ARE YOU TO NOD OFF OR FALL ASLEEP WHILE SITTING AND TALKING TO SOMEONE: WOULD NEVER DOZE
HOW LIKELY ARE YOU TO NOD OFF OR FALL ASLEEP WHILE SITTING INACTIVE IN A PUBLIC PLACE: WOULD NEVER DOZE
HOW LIKELY ARE YOU TO NOD OFF OR FALL ASLEEP WHILE SITTING QUIETLY AFTER LUNCH WITHOUT ALCOHOL: WOULD NEVER DOZE
HOW LIKELY ARE YOU TO NOD OFF OR FALL ASLEEP WHILE SITTING INACTIVE IN A PUBLIC PLACE: WOULD NEVER DOZE
HOW LIKELY ARE YOU TO NOD OFF OR FALL ASLEEP WHILE WATCHING TV: WOULD NEVER DOZE
HOW LIKELY ARE YOU TO NOD OFF OR FALL ASLEEP WHEN YOU ARE A PASSENGER IN A CAR FOR AN HOUR WITHOUT A BREAK: MODERATE CHANCE OF DOZING
HOW LIKELY ARE YOU TO NOD OFF OR FALL ASLEEP WHILE SITTING AND READING: WOULD NEVER DOZE

## 2025-05-01 ENCOUNTER — TELEPHONE (OUTPATIENT)
Age: 60
End: 2025-05-01

## 2025-05-01 NOTE — TELEPHONE ENCOUNTER
Alert for loenardo episode on 4/30 at 0915am for 30 seconds. Rates in the 30's. Patient called and was asymptomatic.     She takes Cardizem prn however did not take it yesterday.  No recent tachy episodes.                   She experienced a near fainting episode while driving yesterday, which was more severe than her usual lightheadedness. She also reported feeling flutters, mild chest pain, and nausea this morning. Her blood pressure was 110/84 at the doctor's office just before she left. She described her blood pressure being normal at that time. She had a similar episode in 09/2024 that lasted for 3 days, during which she felt flutters, lightheadedness, and an overall sense of not feeling well. On the third day, she felt unable to function and comprehend things. She described an event of near syncope and lightheadedness, and her loop recorder was interrogated with no arrhythmia.      She has a history of a loop recorder and has had a previous echocardiogram performed. She is not currently taking Plavix or nitroglycerin. She has stopped taking temazepam as it was not helping with her sleep and is now taking trazodone instead. She is also taking aspirin, cetirizine, citalopram, Dexcom, diltiazem 30 mg three times a day as needed for palpitations, nitroglycerin as needed, Livalo, and Mounjaro 7.5. She has been alternating between 15 and 12 mg of Mounjaro for about 6 months to a year due to stock issues. She took 15 mg of Mounjaro on Monday. She has lost about 30 pounds since starting Mounjaro.

## 2025-05-01 NOTE — TELEPHONE ENCOUNTER
Patient called and below was discussed. Patient has not taken the Cardizem as she has not needed it. Will monitor.

## 2025-05-01 NOTE — TELEPHONE ENCOUNTER
I believe the diltiazem has been scheduled as as needed.  She may want to hold off for now and we can continue to monitor.

## 2025-07-13 PROCEDURE — 93298 REM INTERROG DEV EVAL SCRMS: CPT | Performed by: INTERNAL MEDICINE

## 2025-07-22 ENCOUNTER — APPOINTMENT (OUTPATIENT)
Dept: PHYSICAL THERAPY | Age: 60
End: 2025-07-22
Payer: COMMERCIAL

## 2025-07-23 ENCOUNTER — HOSPITAL ENCOUNTER (OUTPATIENT)
Dept: PHYSICAL THERAPY | Age: 60
Setting detail: RECURRING SERIES
Discharge: HOME | End: 2025-07-26
Payer: COMMERCIAL

## 2025-07-23 DIAGNOSIS — M25.551 PAIN IN RIGHT HIP: ICD-10-CM

## 2025-07-23 DIAGNOSIS — M54.51 VERTEBROGENIC LOW BACK PAIN: Primary | ICD-10-CM

## 2025-07-23 PROCEDURE — 97110 THERAPEUTIC EXERCISES: CPT

## 2025-07-23 PROCEDURE — 97161 PT EVAL LOW COMPLEX 20 MIN: CPT

## 2025-07-23 NOTE — THERAPY EVALUATION
Jess Wahl  : 1965  Primary: [employer] (Mya GONZALE[facility])  Secondary:  [facility] @ [place] [address] [place] [address]  Phone: [phone]  Fax: [phone] Plan Frequency: 2xs a week for 6 weeks    Plan of Care/Certification Expiration Date: 25        Plan of Care/Certification Expiration Date:  Plan of Care/Certification Expiration Date: 25    Frequency/Duration: Plan Frequency: 2xs a week for 6 weeks      Time In/Out:   Time In: 0800  Time Out: 0900      PT Visit Info:         Visit Count:  1                OUTPATIENT PHYSICAL THERAPY:             Initial Assessment 2025               Episode (low back pain)         Treatment Diagnosis:     Vertebrogenic low back pain  Pain in right hip  Medical/Referring Diagnosis:    Low back pain, unspecified    Referring Physician:  Navin Loomis APRN - NP MD Orders:  PT Eval and Treat   Return MD Appt:    Date of Onset:      Allergies:  Adhesive tape, Other, and Pseudoephedrine  Restrictions/Precautions:    None      Medications Last Reviewed: 2025     SUBJECTIVE   History of Injury/Illness (Reason for Referral):  Pt has had back pain for a while and she feels like lately it is getting worse. Pt has pain in her right side low back and a sharp pain in her low back in the center.  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 Wahl  has a past medical history of Bell's palsy, CAD (coronary artery disease), Diabetes (HCC), History of anxiety, Hypertension, Menopause, Murmur, PMDD (premenstrual dysphoric disorder), and Rash.  Ms. Wahl  has a past surgical history that includes Tubal ligation;  section; Cholecystectomy, laparoscopic; [place] tooth extraction; orthopedic surgery (Left, ); orthopedic

## 2025-07-24 ENCOUNTER — APPOINTMENT (OUTPATIENT)
Dept: PHYSICAL THERAPY | Age: 60
End: 2025-07-24
Payer: COMMERCIAL

## 2025-07-25 NOTE — PROGRESS NOTES
Jess Wahl  : 1965  Primary: [employer] (Mya GONZALEZ)  Secondary:  [facility] [place] @ [place] [address] [address] [place] [address]  Phone: [phone]  Fax: [phone] Plan Frequency: 2xs a week for 6 weeks    Plan of Care/Certification Expiration Date: 25        Plan of Care/Certification Expiration Date:  Plan of Care/Certification Expiration Date: 25    Frequency/Duration: Plan Frequency: 2xs a week for 6 weeks      Time In/Out:   Time In: 0800  Time Out: 0900      PT Visit Info:         Visit Count:  1    OUTPATIENT PHYSICAL THERAPY:   Treatment Note 2025       Episode  (low back pain)               Treatment Diagnosis:    Vertebrogenic low back pain  Pain in right hip  Medical/Referring Diagnosis:    Low back pain, unspecified    Referring Physician:  Navin Loomis APRN - NP MD Orders:  PT Eval and Treat   Return MD Appt:     Date of Onset:  No data recorded   Allergies:   Adhesive tape, Other, and Pseudoephedrine  Restrictions/Precautions:   None      Interventions Planned (Treatment may consist of any combination of the following):     See Assessment Note    Subjective Comments:   See Eval  Initial Pain Level:      /10  Post Session Pain Level:       /10  Medications Last Reviewed: 2025  Updated Objective Findings:  See Evaluation Note from today  Treatment       THERAPEUTIC EXERCISE: (15 minutes):    Exercises per grid below to improve mobility, strength, balance, and coordination.   Progressed resistance and repetitions as indicated.     Date:  2025 Date:   Date:     Activity/Exercise Parameters Parameters Parameters   Edu POC, EDU, pain management, Driving requirements, Sleep progressions, Aggs and Eases, Contraindications, HEP, Expectations, Goals,        Sit to stands 10xs     Ivelisse pose  2 min                                 THERAPEUTIC ACTIVITY: ( 0 minutes):    Therapeutic activities per grid below to improve

## 2025-07-28 ENCOUNTER — HOSPITAL ENCOUNTER (OUTPATIENT)
Dept: PHYSICAL THERAPY | Age: 60
Setting detail: RECURRING SERIES
Discharge: HOME | End: 2025-07-31
Payer: COMMERCIAL

## 2025-07-28 PROCEDURE — 97110 THERAPEUTIC EXERCISES: CPT

## 2025-07-28 NOTE — PROGRESS NOTES
Jess Wahl  : 1965  Primary: [employer] (Mya G[facility]ZALE[facility])  Secondary:  [facility] @ [place] [address] [address] [place] [address]  Phone: [phone]  Fax: [phone] Plan Frequency: 2xs a week for 6 weeks    Plan of Care/Certification Expiration Date: 25        Plan of Care/Certification Expiration Date:  Plan of Care/Certification Expiration Date: 25    Frequency/Duration: Plan Frequency: 2xs a week for 6 weeks      Time In/Out:   Time In: 0900  Time Out: 0945      PT Visit Info:         Visit Count:  2    OUTPATIENT PHYSICAL THERAPY:   Treatment Note 2025       Episode  (low back pain)               Treatment Diagnosis:    No data found  Medical/Referring Diagnosis:    Low back pain, unspecified    Referring Physician:  Navin Loomis APRN - NP MD Orders:  PT Eval and Treat   Return MD Appt:     Date of Onset:  No data recorded   Allergies:   Adhesive tape, Other, and Pseudoephedrine  Restrictions/Precautions:   None      Interventions Planned (Treatment may consist of any combination of the following):     See Assessment Note    Subjective Comments:   See Eval  Initial Pain Level:      /10  Post Session Pain Level:       /10  Medications Last Reviewed: 2025  Updated Objective Findings:  See Evaluation Note from today  Treatment       THERAPEUTIC EXERCISE: (15 minutes):    Exercises per grid below to improve mobility, strength, balance, and coordination.   Progressed resistance and repetitions as indicated.     Date:  2025 Date:  2025 Date:     Activity/Exercise Parameters Parameters Parameters   Edu POC, EDU, pain management, Driving requirements, Sleep progressions, Aggs and Eases, Contraindications, HEP, Expectations, Goals,        Sit to stands 10xs 30xs    Ivelisse pose  2 min 2 min    windshield  20xs    deadlifts  15 lbs 3x5    sidesteps  Black band 2xs    deadlifts  15lbs 15xs    Calf raises  30xs

## 2025-07-30 ENCOUNTER — HOSPITAL ENCOUNTER (OUTPATIENT)
Dept: PHYSICAL THERAPY | Age: 60
Setting detail: RECURRING SERIES
Discharge: HOME | End: 2025-08-02
Payer: COMMERCIAL

## 2025-07-30 PROCEDURE — 97110 THERAPEUTIC EXERCISES: CPT

## 2025-07-30 NOTE — PROGRESS NOTES
Jess Wahl  : 1965  Primary: [employer] ([facility])  Secondary:  [facility] @ [place] [address] [address] [place] [address]  Phone: [phone]  Fax: [phone] Plan Frequency: 2xs a week for 6 weeks    Plan of Care/Certification Expiration Date: 25        Plan of Care/Certification Expiration Date:  Plan of Care/Certification Expiration Date: 25    Frequency/Duration: Plan Frequency: 2xs a week for 6 weeks      Time In/Out:   Time In: 1115  Time Out: 1200      PT Visit Info:         Visit Count:  3    OUTPATIENT PHYSICAL THERAPY:   Treatment Note 2025       Episode  (low back pain)               Treatment Diagnosis:    No data found  Medical/Referring Diagnosis:    Low back pain, unspecified    Referring Physician:  Navin Loomis APRN - NP MD Orders:  PT Eval and Treat   Return MD Appt:     Date of Onset:  No data recorded   Allergies:   Adhesive tape, Other, and Pseudoephedrine  Restrictions/Precautions:   None      Interventions Planned (Treatment may consist of any combination of the following):     See Assessment Note    Subjective Comments:   See Eval  Initial Pain Level:      /10  Post Session Pain Level:       /10  Medications Last Reviewed: 2025  Updated Objective Findings:  See Evaluation Note from today  Treatment       THERAPEUTIC EXERCISE: (15 minutes):    Exercises per grid below to improve mobility, strength, balance, and coordination.   Progressed resistance and repetitions as indicated.     Date:  2025 Date:  2025 Date:  2025   Activity/Exercise Parameters Parameters Parameters   Edu POC, EDU, pain management, Driving requirements, Sleep progressions, Aggs and Eases, Contraindications, HEP, Expectations, Goals,        Sit to stands 10xs 30xs 30xs 10 lbs    Ivelisse pose  2 min 2 min 3 min with deep breathing   windshield  20xs 25xs   deadlifts  15 lbs 3x5    sidesteps  Black band 2xs Black band

## 2025-08-04 ENCOUNTER — OFFICE VISIT (OUTPATIENT)
Age: 60
End: 2025-08-04
Payer: COMMERCIAL

## 2025-08-04 VITALS
HEART RATE: 55 BPM | SYSTOLIC BLOOD PRESSURE: 130 MMHG | BODY MASS INDEX: 30.04 KG/M2 | DIASTOLIC BLOOD PRESSURE: 70 MMHG | WEIGHT: 175 LBS

## 2025-08-04 DIAGNOSIS — R00.2 PALPITATIONS: ICD-10-CM

## 2025-08-04 DIAGNOSIS — R00.1 BRADYCARDIA: Primary | ICD-10-CM

## 2025-08-04 DIAGNOSIS — I49.3 PVC'S (PREMATURE VENTRICULAR CONTRACTIONS): ICD-10-CM

## 2025-08-04 DIAGNOSIS — E78.5 HYPERLIPIDEMIA LDL GOAL <70: ICD-10-CM

## 2025-08-04 PROCEDURE — 93000 ELECTROCARDIOGRAM COMPLETE: CPT | Performed by: INTERNAL MEDICINE

## 2025-08-04 PROCEDURE — 99214 OFFICE O/P EST MOD 30 MIN: CPT | Performed by: INTERNAL MEDICINE

## 2025-08-04 RX ORDER — TIRZEPATIDE 15 MG/.5ML
15 INJECTION, SOLUTION SUBCUTANEOUS WEEKLY
COMMUNITY

## 2025-08-05 ENCOUNTER — APPOINTMENT (OUTPATIENT)
Dept: PHYSICAL THERAPY | Age: 60
End: 2025-08-05
Payer: COMMERCIAL

## 2025-08-07 ENCOUNTER — HOSPITAL ENCOUNTER (OUTPATIENT)
Dept: PHYSICAL THERAPY | Age: 60
Setting detail: RECURRING SERIES
Discharge: HOME | End: 2025-08-10
Payer: COMMERCIAL

## 2025-08-07 PROCEDURE — 97110 THERAPEUTIC EXERCISES: CPT

## 2025-08-12 ENCOUNTER — APPOINTMENT (OUTPATIENT)
Dept: PHYSICAL THERAPY | Age: 60
End: 2025-08-12
Payer: COMMERCIAL

## 2025-08-14 ENCOUNTER — HOSPITAL ENCOUNTER (OUTPATIENT)
Dept: PHYSICAL THERAPY | Age: 60
Setting detail: RECURRING SERIES
Discharge: HOME | End: 2025-08-17
Payer: COMMERCIAL

## 2025-08-14 PROCEDURE — 97110 THERAPEUTIC EXERCISES: CPT

## 2025-08-14 PROCEDURE — 93298 REM INTERROG DEV EVAL SCRMS: CPT | Performed by: INTERNAL MEDICINE

## 2025-08-19 ENCOUNTER — APPOINTMENT (OUTPATIENT)
Dept: PHYSICAL THERAPY | Age: 60
End: 2025-08-19
Payer: COMMERCIAL

## 2025-08-21 ENCOUNTER — APPOINTMENT (OUTPATIENT)
Dept: PHYSICAL THERAPY | Age: 60
End: 2025-08-21
Payer: COMMERCIAL

## 2025-08-26 ENCOUNTER — APPOINTMENT (OUTPATIENT)
Dept: PHYSICAL THERAPY | Age: 60
End: 2025-08-26
Payer: COMMERCIAL

## 2025-08-28 ENCOUNTER — HOSPITAL ENCOUNTER (OUTPATIENT)
Dept: PHYSICAL THERAPY | Age: 60
Setting detail: RECURRING SERIES
Discharge: HOME | End: 2025-08-31
Payer: COMMERCIAL

## 2025-08-28 PROCEDURE — 97110 THERAPEUTIC EXERCISES: CPT

## (undated) DEVICE — SPONGE GZ W4XL4IN COT 12 PLY TYP VII WVN C FLD DSGN

## (undated) DEVICE — BNDG ELAS ESMARK 4INX12FT LF -- STRL

## (undated) DEVICE — Device

## (undated) DEVICE — [SMALL-JOINT FULL RADIUS CUTTER, ARTHROSCOPIC SHAVER BLADE,  DO NOT RESTERILIZE,  DO NOT USE IF PACKAGE IS DAMAGED,  KEEP DRY,  KEEP AWAY FROM SUNLIGHT]: Brand: FORMULA

## (undated) DEVICE — SUTURE MCRYL SZ 3-0 L27IN ABSRB UD L19MM PS-2 3/8 CIR PRIM Y427H

## (undated) DEVICE — DRSG POSTOP PRMSL AG 3.5X4IN

## (undated) DEVICE — DRAPE C ARM W54XL84IN MINI FOR OEC 6800

## (undated) DEVICE — REM POLYHESIVE ADULT PATIENT RETURN ELECTRODE: Brand: VALLEYLAB

## (undated) DEVICE — SOLUTION IV 1000ML 0.9% SOD CHL

## (undated) DEVICE — PAD,ABDOMINAL,5"X9",ST,LF,25/BX: Brand: MEDLINE INDUSTRIES, INC.

## (undated) DEVICE — WEBRIL COTTON UNDERCAST PADDING: Brand: WEBRIL

## (undated) DEVICE — FOOT DR TOLLISON & WOMACK: Brand: MEDLINE INDUSTRIES, INC.

## (undated) DEVICE — SUT ETHLN 3-0 18IN PS1 BLK --

## (undated) DEVICE — (D)PREP SKN CHLRAPRP APPL 26ML -- CONVERT TO ITEM 371833

## (undated) DEVICE — DRSG GZ OIL EMUL CURAD 3X8 --

## (undated) DEVICE — BUTTON SWITCH PENCIL BLADE ELECTRODE, HOLSTER: Brand: EDGE

## (undated) DEVICE — SUTURE VCRL SZ 2-0 L27IN ABSRB UD L26MM CT-2 1/2 CIR J269H

## (undated) DEVICE — OUTFLOW CASSETTE TUBING, DO NOT USE IF PACKAGE IS DAMAGED: Brand: CROSSFLOW

## (undated) DEVICE — BNDG,ELSTC,MATRIX,STRL,4"X5YD,LF,HOOK&LP: Brand: MEDLINE

## (undated) DEVICE — DRILL 4MM FOR 4.5MM ANCHOR SL-PLUS

## (undated) DEVICE — SOLUTION IRRIG 3000ML 0.9% SOD CHL FLX CONT 0797208] ICU MEDICAL INC]

## (undated) DEVICE — BANDAGE,ELASTIC,ESMARK,STERILE,4"X9',LF: Brand: MEDLINE

## (undated) DEVICE — BANDAGE,GAUZE,CONFORMING,4"X75",STRL,LF: Brand: MEDLINE

## (undated) DEVICE — NDL SPNE QNCKE 18GX3.5IN LF --

## (undated) DEVICE — NEEDLE HYPO 18GA L1.5IN PNK S STL HUB POLYPR SHLD REG BVL

## (undated) DEVICE — DISPOSABLE KIT FOR 1.8 MM Q-FIX                                    IMPLANT, INCLUDES DRILL, DRILL GUIDE                                    AND OBTURATOR: Brand: Q-FIX

## (undated) DEVICE — ZIMMER® STERILE DISPOSABLE TOURNIQUET CUFF WITH PLC, DUAL PORT, SINGLE BLADDER, 30 IN. (76 CM)

## (undated) DEVICE — PADDING CAST W4INXL4YD NONSTERILE COT COHESIVE HND TEARABLE

## (undated) DEVICE — 2000CC GUARDIAN II: Brand: GUARDIAN

## (undated) DEVICE — SYR LR LCK 1ML GRAD NSAF 30ML --

## (undated) DEVICE — CARDINAL HEALTH FLEXIBLE LIGHT HANDLE COVER: Brand: CARDINAL HEALTH

## (undated) DEVICE — STERILE HOOK LOCK LATEX FREE ELASTIC BANDAGE 6INX5YD: Brand: HOOK LOCK™

## (undated) DEVICE — PADDING CAST W4INXL4YD ST COT COHESIVE HND TEARABLE SPEC

## (undated) DEVICE — INFLOW CASSETTE TUBING, DO NOT USE IF PACKAGE IS DAMAGED: Brand: CROSSFLOW

## (undated) DEVICE — BLADE SURG NO15 S STL STR DISP GLASSVAN

## (undated) DEVICE — SPLINT CAST W4XL30IN WHT THMB FBRGLS PRECUT INTLOK WRINKLE

## (undated) DEVICE — BANDAGE,GAUZE,BULKEE II,4.5"X4.1YD,STRL: Brand: MEDLINE